# Patient Record
Sex: MALE | Race: WHITE | NOT HISPANIC OR LATINO | Employment: FULL TIME | URBAN - METROPOLITAN AREA
[De-identification: names, ages, dates, MRNs, and addresses within clinical notes are randomized per-mention and may not be internally consistent; named-entity substitution may affect disease eponyms.]

---

## 2023-05-22 LAB
ALANINE AMINOTRANSFERASE (SGPT) (U/L) IN SER/PLAS: 30 U/L (ref 10–52)
ALBUMIN (G/DL) IN SER/PLAS: 4.3 G/DL (ref 3.4–5)
ALKALINE PHOSPHATASE (U/L) IN SER/PLAS: 47 U/L (ref 33–120)
ANION GAP IN SER/PLAS: 12 MMOL/L (ref 10–20)
ASPARTATE AMINOTRANSFERASE (SGOT) (U/L) IN SER/PLAS: 23 U/L (ref 9–39)
BILIRUBIN TOTAL (MG/DL) IN SER/PLAS: 0.3 MG/DL (ref 0–1.2)
C REACTIVE PROTEIN (MG/L) IN SER/PLAS: 0.1 MG/DL
CALCIDIOL (25 OH VITAMIN D3) (NG/ML) IN SER/PLAS: 23 NG/ML
CALCIUM (MG/DL) IN SER/PLAS: 9.8 MG/DL (ref 8.6–10.3)
CARBON DIOXIDE, TOTAL (MMOL/L) IN SER/PLAS: 26 MMOL/L (ref 21–32)
CHLORIDE (MMOL/L) IN SER/PLAS: 105 MMOL/L (ref 98–107)
CHOLESTEROL (MG/DL) IN SER/PLAS: 151 MG/DL (ref 0–199)
CHOLESTEROL IN HDL (MG/DL) IN SER/PLAS: 31.3 MG/DL
CHOLESTEROL/HDL RATIO: 4.8
CREATINE KINASE (U/L) IN SER/PLAS: 111 U/L (ref 0–325)
CREATININE (MG/DL) IN SER/PLAS: 0.81 MG/DL (ref 0.5–1.3)
ERYTHROCYTE DISTRIBUTION WIDTH (RATIO) BY AUTOMATED COUNT: 13.6 % (ref 11.5–14.5)
ERYTHROCYTE MEAN CORPUSCULAR HEMOGLOBIN CONCENTRATION (G/DL) BY AUTOMATED: 33.6 G/DL (ref 32–36)
ERYTHROCYTE MEAN CORPUSCULAR VOLUME (FL) BY AUTOMATED COUNT: 88 FL (ref 80–100)
ERYTHROCYTES (10*6/UL) IN BLOOD BY AUTOMATED COUNT: 5.15 X10E12/L (ref 4.5–5.9)
ESTIMATED AVERAGE GLUCOSE FOR HBA1C: 108 MG/DL
GFR MALE: >90 ML/MIN/1.73M2
GLUCOSE (MG/DL) IN SER/PLAS: 87 MG/DL (ref 74–99)
HEMATOCRIT (%) IN BLOOD BY AUTOMATED COUNT: 45.5 % (ref 41–52)
HEMOGLOBIN (G/DL) IN BLOOD: 15.3 G/DL (ref 13.5–17.5)
HEMOGLOBIN A1C/HEMOGLOBIN TOTAL IN BLOOD: 5.4 %
LDL: 78 MG/DL (ref 0–99)
LEUKOCYTES (10*3/UL) IN BLOOD BY AUTOMATED COUNT: 5.4 X10E9/L (ref 4.4–11.3)
NON HDL CHOLESTEROL: 120 MG/DL
PLATELETS (10*3/UL) IN BLOOD AUTOMATED COUNT: 240 X10E9/L (ref 150–450)
POTASSIUM (MMOL/L) IN SER/PLAS: 4.5 MMOL/L (ref 3.5–5.3)
PROTEIN TOTAL: 8 G/DL (ref 6.4–8.2)
SEDIMENTATION RATE, ERYTHROCYTE: 8 MM/H (ref 0–15)
SODIUM (MMOL/L) IN SER/PLAS: 138 MMOL/L (ref 136–145)
THYROTROPIN (MIU/L) IN SER/PLAS BY DETECTION LIMIT <= 0.05 MIU/L: 1.44 MIU/L (ref 0.44–3.98)
TRIGLYCERIDE (MG/DL) IN SER/PLAS: 211 MG/DL (ref 0–149)
UREA NITROGEN (MG/DL) IN SER/PLAS: 10 MG/DL (ref 6–23)
VLDL: 42 MG/DL (ref 0–40)

## 2023-05-23 ENCOUNTER — APPOINTMENT (OUTPATIENT)
Dept: PRIMARY CARE | Facility: CLINIC | Age: 43
End: 2023-05-23
Payer: COMMERCIAL

## 2023-05-23 LAB
ANTI-DNA (DS): <1 IU/ML
COMPLEMENT C3 (MG/DL) IN SER/PLAS: 147 MG/DL (ref 87–200)
COMPLEMENT C4 (MG/DL) IN SER/PLAS: 39 MG/DL (ref 10–50)

## 2023-05-24 ENCOUNTER — TELEPHONE (OUTPATIENT)
Dept: PRIMARY CARE | Facility: CLINIC | Age: 43
End: 2023-05-24
Payer: COMMERCIAL

## 2023-05-24 DIAGNOSIS — M19.071 ARTHRITIS OF ANKLE, RIGHT: ICD-10-CM

## 2023-05-24 DIAGNOSIS — M25.579 ANKLE PAIN, UNSPECIFIED CHRONICITY, UNSPECIFIED LATERALITY: ICD-10-CM

## 2023-05-24 DIAGNOSIS — M19.072 ARTHRITIS OF ANKLE, LEFT: ICD-10-CM

## 2023-05-24 DIAGNOSIS — R76.8 ANA POSITIVE: ICD-10-CM

## 2023-05-24 PROBLEM — F41.1 GENERALIZED ANXIETY DISORDER: Status: ACTIVE | Noted: 2023-05-24

## 2023-05-24 PROBLEM — G47.33 OBSTRUCTIVE SLEEP APNEA: Status: ACTIVE | Noted: 2023-05-24

## 2023-05-24 PROBLEM — L40.9 PSORIASIS: Status: ACTIVE | Noted: 2023-05-24

## 2023-05-24 PROBLEM — I10 BENIGN ESSENTIAL HYPERTENSION: Status: ACTIVE | Noted: 2023-05-24

## 2023-05-24 PROBLEM — E55.9 VITAMIN D DEFICIENCY: Status: ACTIVE | Noted: 2023-05-24

## 2023-05-24 PROBLEM — M35.9 CONNECTIVE TISSUE DISEASE (MULTI): Status: ACTIVE | Noted: 2023-05-24

## 2023-05-24 PROBLEM — M54.16 CHRONIC LUMBAR RADICULOPATHY: Status: ACTIVE | Noted: 2023-05-24

## 2023-05-24 PROBLEM — M21.6X2 ACQUIRED BILATERAL PES CAVUS: Status: ACTIVE | Noted: 2023-05-24

## 2023-05-24 PROBLEM — M79.671 RIGHT FOOT PAIN: Status: ACTIVE | Noted: 2023-05-24

## 2023-05-24 PROBLEM — E66.812 CLASS 2 SEVERE OBESITY WITH BODY MASS INDEX (BMI) OF 35 TO 39.9 WITH SERIOUS COMORBIDITY: Status: ACTIVE | Noted: 2023-05-24

## 2023-05-24 PROBLEM — F32.1 DEPRESSION, MAJOR, SINGLE EPISODE, MODERATE (MULTI): Status: ACTIVE | Noted: 2023-05-24

## 2023-05-24 PROBLEM — R73.01 IMPAIRED FASTING GLUCOSE: Status: ACTIVE | Noted: 2023-05-24

## 2023-05-24 PROBLEM — M47.816 DEGENERATIVE JOINT DISEASE (DJD) OF LUMBAR SPINE: Status: ACTIVE | Noted: 2023-05-24

## 2023-05-24 PROBLEM — M21.619 ASYMPTOMATIC BUNION: Status: ACTIVE | Noted: 2023-05-24

## 2023-05-24 PROBLEM — M10.9 GOUT: Status: ACTIVE | Noted: 2023-05-24

## 2023-05-24 PROBLEM — M67.88 RIGHT PERONEAL TENDINOSIS: Status: ACTIVE | Noted: 2023-05-24

## 2023-05-24 PROBLEM — E66.01 MORBID OBESITY (MULTI): Status: ACTIVE | Noted: 2023-05-24

## 2023-05-24 PROBLEM — I73.9: Status: ACTIVE | Noted: 2023-05-24

## 2023-05-24 PROBLEM — L40.50 PSORIATIC ARTHRITIS (MULTI): Status: ACTIVE | Noted: 2023-05-24

## 2023-05-24 PROBLEM — E78.5 HYPERLIPIDEMIA: Status: ACTIVE | Noted: 2023-05-24

## 2023-05-24 PROBLEM — M21.6X1 ACQUIRED BILATERAL PES CAVUS: Status: ACTIVE | Noted: 2023-05-24

## 2023-05-24 PROBLEM — E66.01 CLASS 2 SEVERE OBESITY WITH BODY MASS INDEX (BMI) OF 35 TO 39.9 WITH SERIOUS COMORBIDITY (MULTI): Status: ACTIVE | Noted: 2023-05-24

## 2023-05-24 PROBLEM — K57.32 DIVERTICULITIS OF COLON: Status: ACTIVE | Noted: 2023-05-24

## 2023-05-24 PROBLEM — E78.1 ESSENTIAL HYPERTRIGLYCERIDEMIA: Status: ACTIVE | Noted: 2023-05-24

## 2023-05-24 PROBLEM — M54.42 LOW BACK PAIN WITH LEFT-SIDED SCIATICA: Status: ACTIVE | Noted: 2023-05-24

## 2023-05-24 PROBLEM — I73.00 RAYNAUD'S PHENOMENON WITHOUT GANGRENE: Status: ACTIVE | Noted: 2023-05-24

## 2023-05-24 PROBLEM — G89.29 CHRONIC BACK PAIN GREATER THAN 3 MONTHS DURATION: Status: ACTIVE | Noted: 2023-05-24

## 2023-05-24 PROBLEM — M54.9 CHRONIC BACK PAIN GREATER THAN 3 MONTHS DURATION: Status: ACTIVE | Noted: 2023-05-24

## 2023-05-24 RX ORDER — CELECOXIB 200 MG/1
200 CAPSULE ORAL
Qty: 60 CAPSULE | Refills: 2 | Status: SHIPPED | OUTPATIENT
Start: 2023-05-24 | End: 2023-07-26 | Stop reason: SDUPTHER

## 2023-05-24 RX ORDER — CELECOXIB 200 MG/1
200 CAPSULE ORAL
COMMUNITY
End: 2023-05-24 | Stop reason: SDUPTHER

## 2023-05-24 NOTE — TELEPHONE ENCOUNTER
Refill:   Celecoxib 200mg  1 capsule twice a day    Pharmacy:  [facility]    Last seen: 8/29/2022  Future appointment: 6/26/2023    He just ran out yesterday.  He has made his last script last this long.

## 2023-06-26 ENCOUNTER — APPOINTMENT (OUTPATIENT)
Dept: PRIMARY CARE | Facility: CLINIC | Age: 43
End: 2023-06-26
Payer: COMMERCIAL

## 2023-07-26 ENCOUNTER — OFFICE VISIT (OUTPATIENT)
Dept: PRIMARY CARE | Facility: CLINIC | Age: 43
End: 2023-07-26
Payer: COMMERCIAL

## 2023-07-26 VITALS
HEART RATE: 80 BPM | SYSTOLIC BLOOD PRESSURE: 128 MMHG | HEIGHT: 74 IN | DIASTOLIC BLOOD PRESSURE: 82 MMHG | BODY MASS INDEX: 40.02 KG/M2 | WEIGHT: 311.8 LBS | TEMPERATURE: 97.7 F | OXYGEN SATURATION: 94 %

## 2023-07-26 DIAGNOSIS — M19.071 ARTHRITIS OF ANKLE, RIGHT: ICD-10-CM

## 2023-07-26 DIAGNOSIS — E55.9 VITAMIN D DEFICIENCY: ICD-10-CM

## 2023-07-26 DIAGNOSIS — H61.23 BILATERAL IMPACTED CERUMEN: ICD-10-CM

## 2023-07-26 DIAGNOSIS — M19.072 ARTHRITIS OF ANKLE, LEFT: ICD-10-CM

## 2023-07-26 DIAGNOSIS — L40.50 PSORIATIC ARTHRITIS (MULTI): ICD-10-CM

## 2023-07-26 DIAGNOSIS — R73.01 IMPAIRED FASTING GLUCOSE: ICD-10-CM

## 2023-07-26 DIAGNOSIS — E78.5 HYPERLIPIDEMIA, UNSPECIFIED HYPERLIPIDEMIA TYPE: ICD-10-CM

## 2023-07-26 DIAGNOSIS — E66.01 MORBID OBESITY (MULTI): ICD-10-CM

## 2023-07-26 DIAGNOSIS — R76.8 ANA POSITIVE: ICD-10-CM

## 2023-07-26 DIAGNOSIS — I10 BENIGN ESSENTIAL HYPERTENSION: ICD-10-CM

## 2023-07-26 DIAGNOSIS — M25.579 ANKLE PAIN, UNSPECIFIED CHRONICITY, UNSPECIFIED LATERALITY: ICD-10-CM

## 2023-07-26 DIAGNOSIS — F32.1 DEPRESSION, MAJOR, SINGLE EPISODE, MODERATE (MULTI): Primary | ICD-10-CM

## 2023-07-26 PROCEDURE — 1036F TOBACCO NON-USER: CPT | Performed by: FAMILY MEDICINE

## 2023-07-26 PROCEDURE — 3074F SYST BP LT 130 MM HG: CPT | Performed by: FAMILY MEDICINE

## 2023-07-26 PROCEDURE — 69209 REMOVE IMPACTED EAR WAX UNI: CPT | Performed by: FAMILY MEDICINE

## 2023-07-26 PROCEDURE — 99214 OFFICE O/P EST MOD 30 MIN: CPT | Performed by: FAMILY MEDICINE

## 2023-07-26 PROCEDURE — 3079F DIAST BP 80-89 MM HG: CPT | Performed by: FAMILY MEDICINE

## 2023-07-26 RX ORDER — FLUOXETINE HYDROCHLORIDE 40 MG/1
40 CAPSULE ORAL DAILY
COMMUNITY
End: 2023-07-26 | Stop reason: SDUPTHER

## 2023-07-26 RX ORDER — LISINOPRIL 20 MG/1
20 TABLET ORAL DAILY
COMMUNITY
End: 2023-07-26 | Stop reason: SDUPTHER

## 2023-07-26 RX ORDER — CYCLOBENZAPRINE HCL 5 MG
10 TABLET ORAL 2 TIMES DAILY
Qty: 120 TABLET | Refills: 5 | Status: SHIPPED | OUTPATIENT
Start: 2023-07-26 | End: 2023-12-18 | Stop reason: WASHOUT

## 2023-07-26 RX ORDER — CELECOXIB 200 MG/1
200 CAPSULE ORAL
Qty: 60 CAPSULE | Refills: 5 | Status: SHIPPED | OUTPATIENT
Start: 2023-07-26 | End: 2024-04-08

## 2023-07-26 RX ORDER — BUSPIRONE HYDROCHLORIDE 15 MG/1
15 TABLET ORAL 2 TIMES DAILY
Qty: 60 TABLET | Refills: 5 | Status: SHIPPED | OUTPATIENT
Start: 2023-07-26 | End: 2024-02-15 | Stop reason: SDUPTHER

## 2023-07-26 RX ORDER — FLUOXETINE HYDROCHLORIDE 40 MG/1
40 CAPSULE ORAL DAILY
Qty: 30 CAPSULE | Refills: 5 | Status: SHIPPED | OUTPATIENT
Start: 2023-07-26 | End: 2024-02-15 | Stop reason: SDUPTHER

## 2023-07-26 RX ORDER — HYDROXYCHLOROQUINE SULFATE 200 MG/1
1 TABLET, FILM COATED ORAL 2 TIMES DAILY
COMMUNITY
End: 2023-10-09 | Stop reason: SDUPTHER

## 2023-07-26 RX ORDER — LISINOPRIL 20 MG/1
20 TABLET ORAL DAILY
Qty: 30 TABLET | Refills: 5 | Status: SHIPPED | OUTPATIENT
Start: 2023-07-26 | End: 2024-02-15 | Stop reason: SDUPTHER

## 2023-07-26 RX ORDER — FENOFIBRATE 160 MG/1
1 TABLET ORAL DAILY
COMMUNITY
Start: 2017-09-22 | End: 2023-07-26 | Stop reason: SDUPTHER

## 2023-07-26 RX ORDER — CYCLOBENZAPRINE HCL 5 MG
10 TABLET ORAL 2 TIMES DAILY
COMMUNITY
Start: 2017-09-20 | End: 2023-07-26 | Stop reason: SDUPTHER

## 2023-07-26 RX ORDER — BUSPIRONE HYDROCHLORIDE 15 MG/1
1 TABLET ORAL 2 TIMES DAILY
COMMUNITY
Start: 2021-04-29 | End: 2023-07-26 | Stop reason: SDUPTHER

## 2023-07-26 RX ORDER — FENOFIBRATE 160 MG/1
160 TABLET ORAL DAILY
Qty: 30 TABLET | Refills: 5 | Status: SHIPPED | OUTPATIENT
Start: 2023-07-26 | End: 2024-02-15 | Stop reason: SDUPTHER

## 2023-07-26 RX ORDER — AMLODIPINE BESYLATE 5 MG/1
1 TABLET ORAL DAILY
COMMUNITY
Start: 2022-10-17 | End: 2023-10-09 | Stop reason: SDUPTHER

## 2023-07-26 ASSESSMENT — ENCOUNTER SYMPTOMS
FEVER: 0
CHEST TIGHTNESS: 0
CONFUSION: 0
ABDOMINAL PAIN: 0
PALPITATIONS: 0
SHORTNESS OF BREATH: 0
ARTHRALGIAS: 1
CHILLS: 0

## 2023-07-26 ASSESSMENT — PATIENT HEALTH QUESTIONNAIRE - PHQ9
SUM OF ALL RESPONSES TO PHQ9 QUESTIONS 1 AND 2: 0
1. LITTLE INTEREST OR PLEASURE IN DOING THINGS: NOT AT ALL
2. FEELING DOWN, DEPRESSED OR HOPELESS: NOT AT ALL

## 2023-07-26 NOTE — PROGRESS NOTES
"Subjective   Patient ID: Chacho William is a 42 y.o. male who presents for Follow-up.    HPI patient today for follow-up of ongoing healthcare issues and review of lab work for most part states has been feeling good.  Taking medications as prescribed.  He has had recent follow-up with his rheumatologist.  And has an appointment for eye exam in August.  Has noticed that his ears been somewhat clogged and occasionally get some wax out of there.    Review of Systems   Constitutional:  Negative for chills and fever.   HENT:  Negative for congestion and ear pain.    Eyes:  Negative for visual disturbance.   Respiratory:  Negative for chest tightness and shortness of breath.    Cardiovascular:  Negative for chest pain and palpitations.   Gastrointestinal:  Negative for abdominal pain.   Musculoskeletal:  Positive for arthralgias.   Skin:  Negative for pallor.   Psychiatric/Behavioral:  Negative for confusion.        Objective   /82   Pulse 80   Temp 36.5 °C (97.7 °F)   Ht 1.88 m (6' 2\")   Wt 141 kg (311 lb 12.8 oz)   SpO2 94%   BMI 40.03 kg/m²     Physical Exam  Vitals and nursing note reviewed.   Constitutional:       General: He is not in acute distress.     Appearance: Normal appearance. He is not ill-appearing.   HENT:      Head: Normocephalic and atraumatic.      Right Ear: Tympanic membrane, ear canal and external ear normal. There is impacted cerumen.      Left Ear: Tympanic membrane, ear canal and external ear normal. There is impacted cerumen.      Mouth/Throat:      Pharynx: Oropharynx is clear.   Eyes:      Extraocular Movements: Extraocular movements intact.   Cardiovascular:      Rate and Rhythm: Normal rate and regular rhythm.      Pulses: Normal pulses.      Heart sounds: Normal heart sounds.   Pulmonary:      Effort: Pulmonary effort is normal.      Breath sounds: Normal breath sounds.   Abdominal:      General: Abdomen is flat. Bowel sounds are normal.      Palpations: Abdomen is soft.      " Tenderness: There is no abdominal tenderness.   Musculoskeletal:         General: Normal range of motion.      Cervical back: Neck supple.   Skin:     General: Skin is warm.   Neurological:      Mental Status: He is alert and oriented to person, place, and time. Mental status is at baseline.   Psychiatric:         Mood and Affect: Mood normal.     Recent labs from May reviewed with the patient    Bilateral ear lavage    Continue to follow with specialist    Return to office 4 months with repeat fasting labs  Necessary refills provided    Assessment/Plan   Problem List Items Addressed This Visit       KATI positive    Ankle pain    Arthritis of ankle, left     Refill Celebrex         Arthritis of ankle, right    Benign essential hypertension     Stable continue current medication         Morbid obesity (CMS/HCC)     Reviewed diet weight loss strategies exercise as tolerated         Depression, major, single episode, moderate (CMS/HCC) - Primary     Stable continue fluoxetine 40 mg daily         Hyperlipidemia     LDLs to goal nice improvement in triglycerides continue fenofibrate 160 mg daily         Impaired fasting glucose     A1c 5.4% continue dietary modifications         Psoriatic arthritis (CMS/HCC)     Clinically stable continue current medications         Vitamin D deficiency     Continue to monitor and supplement         Bilateral impacted cerumen     Ear lavage

## 2023-07-26 NOTE — PROGRESS NOTES
Patient ID: Chacho William is a 42 y.o. male.    Ear Cerumen Removal    Date/Time: 7/26/2023 6:02 PM    Performed by: Ivanna Martinez MA  Authorized by: Justice Vargas MD    Consent:     Consent obtained:  Verbal    Consent given by:  Patient    Risks, benefits, and alternatives were discussed: yes      Risks discussed:  Pain, incomplete removal and dizziness  Universal protocol:     Procedure explained and questions answered to patient or proxy's satisfaction: yes      Site/side marked: yes      Immediately prior to procedure, a time out was called: no      Patient identity confirmed:  Verbally with patient  Procedure details:     Location:  L ear and R ear    Procedure type: irrigation      Procedure outcomes: cerumen removed    Post-procedure details:     Inspection:  Ear canal clear    Procedure completion:  Tolerated well, no immediate complications

## 2023-09-12 ENCOUNTER — TELEPHONE (OUTPATIENT)
Dept: PRIMARY CARE | Facility: CLINIC | Age: 43
End: 2023-09-12
Payer: COMMERCIAL

## 2023-09-12 DIAGNOSIS — M10.00 IDIOPATHIC GOUT, UNSPECIFIED CHRONICITY, UNSPECIFIED SITE: Primary | ICD-10-CM

## 2023-09-12 RX ORDER — PREDNISONE 10 MG/1
TABLET ORAL
Qty: 30 TABLET | Refills: 0 | Status: SHIPPED | OUTPATIENT
Start: 2023-09-12 | End: 2023-09-22

## 2023-09-12 NOTE — TELEPHONE ENCOUNTER
Patient called in and stated that he is having a gout flare-up and he is asking for Prednisone to be sent to [facility] in [place].

## 2023-10-07 PROBLEM — F32.9 MAJOR DEPRESSIVE DISORDER, SINGLE EPISODE: Status: ACTIVE | Noted: 2019-10-02

## 2023-10-07 PROBLEM — M71.161: Status: ACTIVE | Noted: 2019-10-02

## 2023-10-07 PROBLEM — R20.0 ARM NUMBNESS LEFT: Status: ACTIVE | Noted: 2023-10-07

## 2023-10-07 PROBLEM — F41.9 ANXIETY DISORDER, UNSPECIFIED: Status: ACTIVE | Noted: 2019-10-02

## 2023-10-07 PROBLEM — E66.9 OBESITY: Status: ACTIVE | Noted: 2019-10-02

## 2023-10-07 PROBLEM — A41.01 SEPSIS DUE TO METHICILLIN SUSCEPTIBLE STAPHYLOCOCCUS AUREUS (MULTI): Status: ACTIVE | Noted: 2019-10-02

## 2023-10-09 ENCOUNTER — LAB (OUTPATIENT)
Dept: LAB | Facility: LAB | Age: 43
End: 2023-10-09
Payer: COMMERCIAL

## 2023-10-09 ENCOUNTER — OFFICE VISIT (OUTPATIENT)
Dept: RHEUMATOLOGY | Facility: CLINIC | Age: 43
End: 2023-10-09
Payer: COMMERCIAL

## 2023-10-09 VITALS — BODY MASS INDEX: 38.65 KG/M2 | DIASTOLIC BLOOD PRESSURE: 87 MMHG | WEIGHT: 301 LBS | SYSTOLIC BLOOD PRESSURE: 134 MMHG

## 2023-10-09 DIAGNOSIS — I73.00 RAYNAUD'S PHENOMENON WITHOUT GANGRENE: Primary | ICD-10-CM

## 2023-10-09 DIAGNOSIS — M35.9 CONNECTIVE TISSUE DISEASE (MULTI): ICD-10-CM

## 2023-10-09 LAB
CRP SERPL-MCNC: 0.17 MG/DL
ERYTHROCYTE [DISTWIDTH] IN BLOOD BY AUTOMATED COUNT: 13.1 % (ref 11.5–14.5)
ERYTHROCYTE [SEDIMENTATION RATE] IN BLOOD BY WESTERGREN METHOD: 18 MM/H (ref 0–15)
HCT VFR BLD AUTO: 44 % (ref 41–52)
HGB BLD-MCNC: 14.8 G/DL (ref 13.5–17.5)
MCH RBC QN AUTO: 30.1 PG (ref 26–34)
MCHC RBC AUTO-ENTMCNC: 33.6 G/DL (ref 32–36)
MCV RBC AUTO: 89 FL (ref 80–100)
NRBC BLD-RTO: 0 /100 WBCS (ref 0–0)
PLATELET # BLD AUTO: 274 X10*3/UL (ref 150–450)
PMV BLD AUTO: 11.1 FL (ref 7.5–11.5)
RBC # BLD AUTO: 4.92 X10*6/UL (ref 4.5–5.9)
WBC # BLD AUTO: 5.6 X10*3/UL (ref 4.4–11.3)

## 2023-10-09 PROCEDURE — 99212 OFFICE O/P EST SF 10 MIN: CPT | Performed by: INTERNAL MEDICINE

## 2023-10-09 PROCEDURE — 36415 COLL VENOUS BLD VENIPUNCTURE: CPT

## 2023-10-09 PROCEDURE — 3075F SYST BP GE 130 - 139MM HG: CPT | Performed by: INTERNAL MEDICINE

## 2023-10-09 PROCEDURE — 1036F TOBACCO NON-USER: CPT | Performed by: INTERNAL MEDICINE

## 2023-10-09 PROCEDURE — 86140 C-REACTIVE PROTEIN: CPT

## 2023-10-09 PROCEDURE — 85652 RBC SED RATE AUTOMATED: CPT

## 2023-10-09 PROCEDURE — 3079F DIAST BP 80-89 MM HG: CPT | Performed by: INTERNAL MEDICINE

## 2023-10-09 PROCEDURE — 85027 COMPLETE CBC AUTOMATED: CPT

## 2023-10-09 RX ORDER — HYDROXYCHLOROQUINE SULFATE 200 MG/1
200 TABLET, FILM COATED ORAL 2 TIMES DAILY
Qty: 180 TABLET | Refills: 0 | Status: SHIPPED | OUTPATIENT
Start: 2023-10-09 | End: 2024-01-07 | Stop reason: WASHOUT

## 2023-10-09 RX ORDER — AMLODIPINE BESYLATE 5 MG/1
5 TABLET ORAL DAILY
Qty: 90 TABLET | Refills: 0 | Status: SHIPPED | OUTPATIENT
Start: 2023-10-09 | End: 2024-02-12

## 2023-10-09 NOTE — PROGRESS NOTES
"Subjective   Patient ID: Chacho William is a 43 y.o. male who presents for Follow-up.    HPI  43-year-old male with history of CTD, Raynaud's phenomena, chronic low back pain with radiculopathy, gout, psoriasis, anxiety, depression, obesity and EMANI presented for follow-up.    He feels intermittent weakness and left arm.  He reports chronic weakness in the legs and ankles.  Uses ankle braces.  He is following with podiatrist.    Raynaud's is not too bad.    Medications: Hydroxychloroquine(8/2022) and amlodipine.    Review of Systems   All other systems reviewed and are negative.  Objective       8/29/2022     3:51 PM 8/29/2022     4:27 PM 10/17/2022     4:00 PM 12/8/2022     4:00 PM 2/14/2023     4:01 PM 7/26/2023     2:07 PM 10/9/2023     3:44 PM   Vitals   Systolic 131 130 141 119 140 128 134   Diastolic 91 84 96 82 98 82 87   Heart Rate 81   92  80    Temp 36.3 °C (97.3 °F)   36.6 °C (97.8 °F)  36.5 °C (97.7 °F)    Resp 14   14      Height (in)   1.88 m (6' 2\")  1.88 m (6' 2\") 1.88 m (6' 2\")    Weight (lb) 314  313 315 312 311.8 301   BMI 40.32 kg/m2  40.19 kg/m2 40.44 kg/m2 40.06 kg/m2 40.03 kg/m2 38.65 kg/m2   BSA (m2) 2.72 m2  2.72 m2 2.73 m2 2.72 m2 2.71 m2 2.67 m2   Visit Report      Report Report      Physical Exam  Gen. AAO x3, NAD.  HEENT: No pallor or icterus, PERRLA, EOMI.  No cervical lymphadenopathy .  Skin: No rashes.  Heart: S1, S2/ RRR.   Lungs: CTA B.  Abdomen: Soft, NT/ND  MSK: No swollen or tender joint.  Bilateral shoulder with full range of motion.  Bilateral knee and ankles without swelling and tenderness.    Neuro: Sensation to touch intact.Strength 5/5 throughout.   Psych:Appropriate mood and behavior  EXT: No edema  Assessment/Plan   43-year-old male with history of CTD, Raynaud's phenomena, chronic low back pain with radiculopathy, gout, psoriasis, anxiety, depression, obesity and EMANI presented for follow-up.      #1: Connective tissue disease/Raynaud's phenomena.  She is doing " well.  -Continue hydroxychloroquine twice a day.  Eye exam up-to-date.  -Continue amlodipine daily.  -Labs.    Follow-up in 4 months.     This note was partially generated using the Dragon Voice recognition system. There may be some incorrect wording, spelling and/or spelling errors or punctuation errors that were not corrected prior to committing the note to the medical record.    Patient Active Problem List   Diagnosis    Acquired bilateral pes cavus    KATI positive    Ankle pain    Arthritis of ankle, left    Arthritis of ankle, right    Asymptomatic bunion    Benign essential hypertension    Connective tissue disease (CMS/HCC)    Class 2 severe obesity with body mass index (BMI) of 35 to 39.9 with serious comorbidity (CMS/HCC)    Chronic lumbar radiculopathy    Chronic back pain greater than 3 months duration    Morbid obesity (CMS/HCC)    Degenerative joint disease (DJD) of lumbar spine    Depression, major, single episode, moderate (CMS/HCC)    Diverticulitis of colon    Essential hypertriglyceridemia    Generalized anxiety disorder    Gout    Hyperlipidemia    Impaired fasting glucose    Low back pain with left-sided sciatica    Obstructive sleep apnea    Peripheral vascular disorder (CMS/HCC)    Psoriasis    Psoriatic arthritis (CMS/HCC)    Raynaud's phenomenon without gangrene    Right foot pain    Right peroneal tendinosis    Vitamin D deficiency    Bilateral impacted cerumen    Arm numbness left    Sepsis due to methicillin susceptible Staphylococcus aureus (CMS/HCC)    Other infective bursitis, right knee    Obesity    Major depressive disorder, single episode    Anxiety disorder, unspecified      Past Surgical History:   Procedure Laterality Date    OTHER SURGICAL HISTORY  10/15/2019    Tonsillectomy    OTHER SURGICAL HISTORY  10/15/2019    Back surgery    OTHER SURGICAL HISTORY  10/15/2019    Laminectomy    OTHER SURGICAL HISTORY  10/18/2019    Knee surgery    OTHER SURGICAL HISTORY  10/18/2019    Foot  fracture repair      Social History     Tobacco Use    Smoking status: Never    Smokeless tobacco: Never   Substance Use Topics    Alcohol use: Yes     Comment: 5 drinks per month sometimes      Family History   Problem Relation Name Age of Onset    Other (htn) Mother      Breast cancer Mother      Lung cancer Father        Allergies   Allergen Reactions    Nsaids (Non-Steroidal Anti-Inflammatory Drug) Nausea Only and Other      Current Outpatient Medications   Medication Instructions    amLODIPine (NORVASC) 5 mg, oral, Daily    busPIRone (BUSPAR) 15 mg, oral, 2 times daily    celecoxib (CELEBREX) 200 mg, oral, 2 times daily with meals    cyclobenzaprine (FLEXERIL) 10 mg, oral, 2 times daily    fenofibrate (TRIGLIDE) 160 mg, oral, Daily    FLUoxetine (PROZAC) 40 mg, oral, Daily    hydroxychloroquine (PLAQUENIL) 200 mg, oral, 2 times daily    lisinopril 20 mg, oral, Daily

## 2023-10-09 NOTE — PATIENT INSTRUCTIONS
Continue hydroxychloroquine and amlodipine as prescribed.  Labs.  Call me if any question.  Follow-up in 4 months.

## 2023-11-14 ENCOUNTER — TELEPHONE (OUTPATIENT)
Dept: PRIMARY CARE | Facility: CLINIC | Age: 43
End: 2023-11-14
Payer: COMMERCIAL

## 2023-11-14 DIAGNOSIS — J06.9 UPPER RESPIRATORY TRACT INFECTION, UNSPECIFIED TYPE: Primary | ICD-10-CM

## 2023-11-14 RX ORDER — CEFDINIR 300 MG/1
300 CAPSULE ORAL 2 TIMES DAILY
Qty: 20 CAPSULE | Refills: 0 | Status: SHIPPED | OUTPATIENT
Start: 2023-11-14 | End: 2023-11-24

## 2023-11-14 NOTE — TELEPHONE ENCOUNTER
Patient called in with symptoms of chest congestion, cough, and chills. He has not covid tested. He is wanting an antibiotic sent to his preferred pharmacy but will come in if required. Please advise.

## 2023-11-28 ENCOUNTER — APPOINTMENT (OUTPATIENT)
Dept: PRIMARY CARE | Facility: CLINIC | Age: 43
End: 2023-11-28
Payer: COMMERCIAL

## 2023-12-18 ENCOUNTER — OFFICE VISIT (OUTPATIENT)
Dept: PRIMARY CARE | Facility: CLINIC | Age: 43
End: 2023-12-18
Payer: COMMERCIAL

## 2023-12-18 VITALS
WEIGHT: 292.4 LBS | BODY MASS INDEX: 37.53 KG/M2 | HEART RATE: 79 BPM | OXYGEN SATURATION: 94 % | SYSTOLIC BLOOD PRESSURE: 124 MMHG | HEIGHT: 74 IN | TEMPERATURE: 97.3 F | DIASTOLIC BLOOD PRESSURE: 84 MMHG

## 2023-12-18 DIAGNOSIS — I10 BENIGN ESSENTIAL HYPERTENSION: ICD-10-CM

## 2023-12-18 DIAGNOSIS — E78.49 OTHER HYPERLIPIDEMIA: ICD-10-CM

## 2023-12-18 DIAGNOSIS — R73.01 IMPAIRED FASTING GLUCOSE: ICD-10-CM

## 2023-12-18 DIAGNOSIS — E78.1 ESSENTIAL HYPERTRIGLYCERIDEMIA: ICD-10-CM

## 2023-12-18 DIAGNOSIS — E55.9 VITAMIN D DEFICIENCY: ICD-10-CM

## 2023-12-18 DIAGNOSIS — F41.1 GENERALIZED ANXIETY DISORDER: ICD-10-CM

## 2023-12-18 DIAGNOSIS — E66.01 SEVERE OBESITY (BMI 35.0-39.9) WITH COMORBIDITY (MULTI): ICD-10-CM

## 2023-12-18 DIAGNOSIS — Z13.29 THYROID DISORDER SCREEN: ICD-10-CM

## 2023-12-18 DIAGNOSIS — F32.1 DEPRESSION, MAJOR, SINGLE EPISODE, MODERATE (MULTI): Primary | ICD-10-CM

## 2023-12-18 DIAGNOSIS — E78.5 HYPERLIPIDEMIA, UNSPECIFIED HYPERLIPIDEMIA TYPE: ICD-10-CM

## 2023-12-18 PROBLEM — H61.23 BILATERAL IMPACTED CERUMEN: Status: RESOLVED | Noted: 2023-07-26 | Resolved: 2023-12-18

## 2023-12-18 PROBLEM — K57.32 DIVERTICULITIS OF COLON: Status: RESOLVED | Noted: 2023-05-24 | Resolved: 2023-12-18

## 2023-12-18 PROBLEM — M71.161: Status: RESOLVED | Noted: 2019-10-02 | Resolved: 2023-12-18

## 2023-12-18 PROBLEM — J06.9 UPPER RESPIRATORY TRACT INFECTION: Status: RESOLVED | Noted: 2023-11-14 | Resolved: 2023-12-18

## 2023-12-18 PROBLEM — A41.01 SEPSIS DUE TO METHICILLIN SUSCEPTIBLE STAPHYLOCOCCUS AUREUS (MULTI): Status: RESOLVED | Noted: 2019-10-02 | Resolved: 2023-12-18

## 2023-12-18 PROBLEM — M67.88 RIGHT PERONEAL TENDINOSIS: Status: RESOLVED | Noted: 2023-05-24 | Resolved: 2023-12-18

## 2023-12-18 PROCEDURE — 3074F SYST BP LT 130 MM HG: CPT | Performed by: FAMILY MEDICINE

## 2023-12-18 PROCEDURE — 1036F TOBACCO NON-USER: CPT | Performed by: FAMILY MEDICINE

## 2023-12-18 PROCEDURE — 99213 OFFICE O/P EST LOW 20 MIN: CPT | Performed by: FAMILY MEDICINE

## 2023-12-18 PROCEDURE — 3079F DIAST BP 80-89 MM HG: CPT | Performed by: FAMILY MEDICINE

## 2023-12-18 RX ORDER — CYCLOBENZAPRINE HCL 10 MG
10 TABLET ORAL 2 TIMES DAILY PRN
COMMUNITY
End: 2024-04-15 | Stop reason: SDUPTHER

## 2023-12-18 ASSESSMENT — ENCOUNTER SYMPTOMS
PALPITATIONS: 0
FEVER: 0
ABDOMINAL PAIN: 0
SHORTNESS OF BREATH: 0
CHILLS: 0
DEPRESSION: 1
CHEST TIGHTNESS: 0
CONFUSION: 0
ARTHRALGIAS: 0

## 2023-12-18 NOTE — PROGRESS NOTES
"Subjective   Patient ID: Chacho William is a 43 y.o. male who presents for Annual Exam (4 month follow up with labs ).    HPI today for follow-up of ongoing healthcare issues and review of lab work unfortunately lab claims he could not find the orders in the system although they were there.  As result he will get his labs done within the next couple weeks and call for results.  Otherwise states has been doing okay continues to follow with rheumatology.  Lower extremity symptoms are doing better.  He has been able to be more active.    Review of Systems   Constitutional:  Negative for chills and fever.   HENT:  Negative for congestion and ear pain.    Eyes:  Negative for visual disturbance.   Respiratory:  Negative for chest tightness and shortness of breath.    Cardiovascular:  Negative for chest pain and palpitations.   Gastrointestinal:  Negative for abdominal pain.   Musculoskeletal:  Negative for arthralgias.   Skin:  Negative for pallor.   Psychiatric/Behavioral:  Negative for confusion.        Objective   /84 (BP Location: Right arm, Patient Position: Sitting, BP Cuff Size: Adult long)   Pulse 79   Temp 36.3 °C (97.3 °F) (Temporal)   Ht 1.88 m (6' 2\")   Wt 133 kg (292 lb 6.4 oz)   SpO2 94%   BMI 37.54 kg/m²     Physical Exam  Vitals and nursing note reviewed.   Constitutional:       General: He is not in acute distress.     Appearance: Normal appearance. He is not ill-appearing.   HENT:      Head: Normocephalic and atraumatic.      Right Ear: Tympanic membrane, ear canal and external ear normal.      Left Ear: Tympanic membrane, ear canal and external ear normal.      Mouth/Throat:      Pharynx: Oropharynx is clear.   Eyes:      Extraocular Movements: Extraocular movements intact.   Cardiovascular:      Rate and Rhythm: Normal rate and regular rhythm.      Pulses: Normal pulses.      Heart sounds: Normal heart sounds.   Pulmonary:      Effort: Pulmonary effort is normal.      Breath sounds: Normal " breath sounds.   Abdominal:      General: Abdomen is flat. Bowel sounds are normal.      Palpations: Abdomen is soft.      Tenderness: There is no abdominal tenderness.   Musculoskeletal:         General: Normal range of motion.      Cervical back: Neck supple.   Skin:     General: Skin is warm.   Neurological:      Mental Status: He is alert and oriented to person, place, and time. Mental status is at baseline.   Psychiatric:         Mood and Affect: Mood normal.     Fasting lab work completed within the next couple weeks call for results    Return to our office 4 months with repeat fasting labs a week before.  Declines flu vaccine  Assessment/Plan   Problem List Items Addressed This Visit             ICD-10-CM    Benign essential hypertension I10     Stable continue current treatment         Relevant Orders    Follow Up In Primary Care - Established    CBC    Comprehensive Metabolic Panel    Depression, major, single episode, moderate (CMS/HCC) - Primary F32.1     Stable continue fluoxetine 40 mg daily         Essential hypertriglyceridemia E78.1     Fenofibrate 160 mg daily check labs         Generalized anxiety disorder F41.1     Stable continue BuSpar 15 mg twice daily         Hyperlipidemia E78.5     Check fasting lipid work on dietary modifications         Relevant Orders    Follow Up In Primary Care - Established    Cholesterol, LDL Direct    Lipid Panel    Impaired fasting glucose R73.01     Low sugar diet check A1c with lab work in the spring 2024         Relevant Orders    Follow Up In Primary Care - Established    Hemoglobin A1C    Vitamin D deficiency E55.9     Continue to monitor supplement as needed         Relevant Orders    Vitamin D 25-Hydroxy,Total (for eval of Vitamin D levels)    Thyroid disorder screen Z13.29     TSH with reflex         Relevant Orders    TSH with reflex to Free T4 if abnormal    Severe obesity (BMI 35.0-39.9) with comorbidity (CMS/HCC) E66.01     Continue to work on lifestyle  modification diet exercise and weight loss

## 2024-02-05 DIAGNOSIS — I10 BENIGN ESSENTIAL HYPERTENSION: ICD-10-CM

## 2024-02-05 DIAGNOSIS — F32.1 DEPRESSION, MAJOR, SINGLE EPISODE, MODERATE (MULTI): ICD-10-CM

## 2024-02-12 ENCOUNTER — LAB (OUTPATIENT)
Dept: LAB | Facility: LAB | Age: 44
End: 2024-02-12
Payer: COMMERCIAL

## 2024-02-12 ENCOUNTER — OFFICE VISIT (OUTPATIENT)
Dept: RHEUMATOLOGY | Facility: CLINIC | Age: 44
End: 2024-02-12
Payer: COMMERCIAL

## 2024-02-12 VITALS — DIASTOLIC BLOOD PRESSURE: 81 MMHG | WEIGHT: 294 LBS | SYSTOLIC BLOOD PRESSURE: 115 MMHG | BODY MASS INDEX: 37.75 KG/M2

## 2024-02-12 DIAGNOSIS — I10 BENIGN ESSENTIAL HYPERTENSION: ICD-10-CM

## 2024-02-12 DIAGNOSIS — E78.5 HYPERLIPIDEMIA, UNSPECIFIED HYPERLIPIDEMIA TYPE: ICD-10-CM

## 2024-02-12 DIAGNOSIS — M35.9 CONNECTIVE TISSUE DISEASE (MULTI): Primary | ICD-10-CM

## 2024-02-12 DIAGNOSIS — I73.00 RAYNAUD'S PHENOMENON WITHOUT GANGRENE: ICD-10-CM

## 2024-02-12 DIAGNOSIS — R73.01 IMPAIRED FASTING GLUCOSE: ICD-10-CM

## 2024-02-12 DIAGNOSIS — F32.1 DEPRESSION, MAJOR, SINGLE EPISODE, MODERATE (MULTI): ICD-10-CM

## 2024-02-12 DIAGNOSIS — M35.9 CONNECTIVE TISSUE DISEASE (MULTI): ICD-10-CM

## 2024-02-12 LAB
ALBUMIN SERPL BCP-MCNC: 4.5 G/DL (ref 3.4–5)
ALP SERPL-CCNC: 63 U/L (ref 33–120)
ALT SERPL W P-5'-P-CCNC: 21 U/L (ref 10–52)
ANION GAP SERPL CALC-SCNC: 12 MMOL/L (ref 10–20)
AST SERPL W P-5'-P-CCNC: 16 U/L (ref 9–39)
BILIRUB SERPL-MCNC: 0.3 MG/DL (ref 0–1.2)
BUN SERPL-MCNC: 16 MG/DL (ref 6–23)
CALCIUM SERPL-MCNC: 10.3 MG/DL (ref 8.6–10.6)
CHLORIDE SERPL-SCNC: 105 MMOL/L (ref 98–107)
CHOLEST SERPL-MCNC: 155 MG/DL (ref 0–199)
CHOLESTEROL/HDL RATIO: 5.3
CO2 SERPL-SCNC: 30 MMOL/L (ref 21–32)
CREAT SERPL-MCNC: 0.8 MG/DL (ref 0.5–1.3)
CRP SERPL-MCNC: <0.1 MG/DL
EGFRCR SERPLBLD CKD-EPI 2021: >90 ML/MIN/1.73M*2
ERYTHROCYTE [DISTWIDTH] IN BLOOD BY AUTOMATED COUNT: 13.2 % (ref 11.5–14.5)
ERYTHROCYTE [SEDIMENTATION RATE] IN BLOOD BY WESTERGREN METHOD: 4 MM/H (ref 0–15)
EST. AVERAGE GLUCOSE BLD GHB EST-MCNC: 103 MG/DL
GLUCOSE SERPL-MCNC: 90 MG/DL (ref 74–99)
HBA1C MFR BLD: 5.2 %
HCT VFR BLD AUTO: 46.2 % (ref 41–52)
HDLC SERPL-MCNC: 29.3 MG/DL
HGB BLD-MCNC: 15.8 G/DL (ref 13.5–17.5)
LDLC SERPL CALC-MCNC: ABNORMAL MG/DL
MCH RBC QN AUTO: 30.7 PG (ref 26–34)
MCHC RBC AUTO-ENTMCNC: 34.2 G/DL (ref 32–36)
MCV RBC AUTO: 90 FL (ref 80–100)
NON HDL CHOLESTEROL: 126 MG/DL (ref 0–149)
NRBC BLD-RTO: 0 /100 WBCS (ref 0–0)
PLATELET # BLD AUTO: 256 X10*3/UL (ref 150–450)
POTASSIUM SERPL-SCNC: 4.5 MMOL/L (ref 3.5–5.3)
PROT SERPL-MCNC: 7.8 G/DL (ref 6.4–8.2)
RBC # BLD AUTO: 5.14 X10*6/UL (ref 4.5–5.9)
SODIUM SERPL-SCNC: 142 MMOL/L (ref 136–145)
TRIGL SERPL-MCNC: 515 MG/DL (ref 0–149)
VLDL: ABNORMAL
WBC # BLD AUTO: 6.6 X10*3/UL (ref 4.4–11.3)

## 2024-02-12 PROCEDURE — 86140 C-REACTIVE PROTEIN: CPT

## 2024-02-12 PROCEDURE — 83036 HEMOGLOBIN GLYCOSYLATED A1C: CPT

## 2024-02-12 PROCEDURE — 80053 COMPREHEN METABOLIC PANEL: CPT

## 2024-02-12 PROCEDURE — 36415 COLL VENOUS BLD VENIPUNCTURE: CPT

## 2024-02-12 PROCEDURE — 85027 COMPLETE CBC AUTOMATED: CPT

## 2024-02-12 PROCEDURE — 85652 RBC SED RATE AUTOMATED: CPT

## 2024-02-12 PROCEDURE — 99213 OFFICE O/P EST LOW 20 MIN: CPT | Performed by: INTERNAL MEDICINE

## 2024-02-12 PROCEDURE — 1036F TOBACCO NON-USER: CPT | Performed by: INTERNAL MEDICINE

## 2024-02-12 PROCEDURE — 3074F SYST BP LT 130 MM HG: CPT | Performed by: INTERNAL MEDICINE

## 2024-02-12 PROCEDURE — 80061 LIPID PANEL: CPT

## 2024-02-12 PROCEDURE — 3079F DIAST BP 80-89 MM HG: CPT | Performed by: INTERNAL MEDICINE

## 2024-02-12 RX ORDER — HYDROXYCHLOROQUINE SULFATE 200 MG/1
200 TABLET, FILM COATED ORAL 2 TIMES DAILY
Qty: 180 TABLET | Refills: 0 | Status: SHIPPED | OUTPATIENT
Start: 2024-02-12 | End: 2024-02-15 | Stop reason: SDUPTHER

## 2024-02-12 RX ORDER — NIFEDIPINE 30 MG/1
30 TABLET, FILM COATED, EXTENDED RELEASE ORAL
Qty: 30 TABLET | Refills: 2 | Status: SHIPPED | OUTPATIENT
Start: 2024-02-12 | End: 2024-05-15

## 2024-02-12 RX ORDER — HYDROXYCHLOROQUINE SULFATE 200 MG/1
200 TABLET, FILM COATED ORAL 2 TIMES DAILY
COMMUNITY
Start: 2024-02-05 | End: 2024-02-12 | Stop reason: SDUPTHER

## 2024-02-12 NOTE — PATIENT INSTRUCTIONS
Stop amlodipine.  Take nifedipine once a day.  Continue hydroxychloroquine twice a day.  Call me if any question.  Follow-up in 3 months.

## 2024-02-12 NOTE — PROGRESS NOTES
Subjective . Chacho William is a 43 y.o. male who presents for Rheumatoid Arthritis and Follow-up.    HPI. 43-year-old male with history of CTD, Raynaud's phenomena, chronic low back pain with radiculopathy, gout, psoriasis, anxiety, depression, obesity and EMANI presented for follow-up.     He reports Raynaud's frequency has increased recently despite taking amlodipine regularly.    Immunosuppression:Hydroxychloroquine(8/2022)     Review of Systems   All other systems reviewed and are negative.    Objective     Blood pressure 115/81, weight 133 kg (294 lb).    Physical Exam  Gen. AAO x3, NAD.  HEENT: No pallor or icterus, PERRLA, EOMI.  No cervical lymphadenopathy .  Skin: No rashes.  Heart: S1, S2/ RRR.   Lungs: CTA B.  Abdomen: Soft, NT/ND  MSK: No swollen or tender joint.    Neuro: Sensation to touch intact.Strength 5/5 throughout.   Psych:Appropriate mood and behavior  EXT: No edema      Assessment/Plan  . 43-year-old male with history of CTD, Raynaud's phenomena, chronic low back pain with radiculopathy, gout, psoriasis, anxiety, depression, obesity and EMANI presented for follow-up.     #1: CTD/Raynaud's phenomenon.  -Discontinue amlodipine and begin nifedipine.  -Continue hydroxychloroquine twice a day.  -Labs.    Follow-up in 3 months.     This note was partially generated using the Dragon Voice recognition system. There may be some incorrect wording, spelling and/or spelling errors or punctuation errors that were not corrected prior to committing the note to the medical record.    Problem List Items Addressed This Visit       Connective tissue disease (CMS/HCC) - Primary    Relevant Medications    hydroxychloroquine (Plaquenil) 200 mg tablet    Other Relevant Orders    C-reactive protein    Sedimentation Rate    Raynaud's phenomenon without gangrene    Relevant Medications    NIFEdipine ER (Adalat CC) 30 mg 24 hr tablet            Active Ambulatory Problems     Diagnosis Date Noted    Acquired bilateral pes  cavus 05/24/2023    KATI positive 05/24/2023    Ankle pain 05/24/2023    Arthritis of ankle, left 05/24/2023    Arthritis of ankle, right 05/24/2023    Asymptomatic bunion 05/24/2023    Benign essential hypertension 05/24/2023    Connective tissue disease (CMS/HCC) 05/24/2023    Class 2 severe obesity with body mass index (BMI) of 35 to 39.9 with serious comorbidity (CMS/HCC) 05/24/2023    Chronic lumbar radiculopathy 05/24/2023    Chronic back pain greater than 3 months duration 05/24/2023    Degenerative joint disease (DJD) of lumbar spine 05/24/2023    Depression, major, single episode, moderate (CMS/[facility]) 05/24/2023    Essential hypertriglyceridemia 05/24/2023    Generalized anxiety disorder 05/24/2023    Gout 05/24/2023    Hyperlipidemia 05/24/2023    Impaired fasting glucose 05/24/2023    Low back pain with left-sided sciatica 05/24/2023    Obstructive sleep apnea 05/24/2023    Peripheral vascular disorder (CMS/HCC) 05/24/2023    Psoriasis 05/24/2023    Psoriatic arthritis (CMS/[facility]) 05/24/2023    Raynaud's phenomenon without gangrene 05/24/2023    Right foot pain 05/24/2023    Vitamin D deficiency 05/24/2023    Arm numbness left 10/07/2023    Obesity 10/02/2019    Thyroid disorder screen 12/18/2023    Severe obesity (BMI 35.0-39.9) with comorbidity (CMS/[facility]) 12/18/2023     Resolved Ambulatory Problems     Diagnosis Date Noted    Morbid obesity (CMS/HCC) 05/24/2023    Diverticulitis of colon 05/24/2023    Right peroneal tendinosis 05/24/2023    Bilateral impacted cerumen 07/26/2023    Sepsis due to methicillin susceptible Staphylococcus aureus (CMS/[facility]) 10/02/2019    Other infective bursitis, right knee 10/02/2019    Upper respiratory tract infection 11/14/2023     Past Medical History:   Diagnosis Date    Personal history of other diseases of the circulatory system     Personal history of other mental and behavioral disorders     Personal history of other specified conditions        Family History   Problem  Relation Name Age of Onset    Other (htn) Mother      Breast cancer Mother      Lung cancer Father         Past Surgical History:   Procedure Laterality Date    OTHER SURGICAL HISTORY  10/15/2019    Tonsillectomy    OTHER SURGICAL HISTORY  10/15/2019    Back surgery    OTHER SURGICAL HISTORY  10/15/2019    Laminectomy    OTHER SURGICAL HISTORY  10/18/2019    Knee surgery    OTHER SURGICAL HISTORY  10/18/2019    Foot fracture repair       Social History     Tobacco Use   Smoking Status Never   Smokeless Tobacco Never       Allergies  Nsaids (non-steroidal anti-inflammatory drug)    Current Meds  Current Outpatient Medications   Medication Instructions    busPIRone (BUSPAR) 15 mg, oral, 2 times daily    cyclobenzaprine (FLEXERIL) 10 mg, oral, 2 times daily PRN    fenofibrate (TRIGLIDE) 160 mg, oral, Daily    FLUoxetine (PROZAC) 40 mg, oral, Daily    hydroxychloroquine (PLAQUENIL) 200 mg, oral, 2 times daily    lisinopril 20 mg, oral, Daily    NIFEdipine ER (ADALAT CC) 30 mg, oral, Daily before breakfast, Do not crush, chew, or split.        Lab Results   Component Value Date    ANATITER 1:2560 07/18/2022    RF <10 07/18/2022    SEDRATE 18 (H) 10/09/2023    CRP 0.17 10/09/2023    URICACID 7.6 (H) 08/15/2022    DNADS <1.0 05/22/2023    CKTOTAL 111 05/22/2023        Jay Martines MD

## 2024-02-15 ENCOUNTER — TELEPHONE (OUTPATIENT)
Dept: PRIMARY CARE | Facility: CLINIC | Age: 44
End: 2024-02-15
Payer: COMMERCIAL

## 2024-02-15 DIAGNOSIS — M35.9 CONNECTIVE TISSUE DISEASE (MULTI): ICD-10-CM

## 2024-02-15 DIAGNOSIS — E78.5 HYPERLIPIDEMIA, UNSPECIFIED HYPERLIPIDEMIA TYPE: ICD-10-CM

## 2024-02-15 DIAGNOSIS — F32.1 DEPRESSION, MAJOR, SINGLE EPISODE, MODERATE (MULTI): ICD-10-CM

## 2024-02-15 DIAGNOSIS — I10 BENIGN ESSENTIAL HYPERTENSION: ICD-10-CM

## 2024-02-15 RX ORDER — LISINOPRIL 20 MG/1
20 TABLET ORAL DAILY
Qty: 30 TABLET | Refills: 11 | Status: SHIPPED | OUTPATIENT
Start: 2024-02-15 | End: 2025-02-14

## 2024-02-15 RX ORDER — BUSPIRONE HYDROCHLORIDE 15 MG/1
15 TABLET ORAL 2 TIMES DAILY
Qty: 60 TABLET | Refills: 11 | Status: SHIPPED | OUTPATIENT
Start: 2024-02-15 | End: 2025-02-14

## 2024-02-15 RX ORDER — FLUOXETINE HYDROCHLORIDE 40 MG/1
40 CAPSULE ORAL DAILY
Qty: 30 CAPSULE | Refills: 0 | OUTPATIENT
Start: 2024-02-15

## 2024-02-15 RX ORDER — BUSPIRONE HYDROCHLORIDE 15 MG/1
15 TABLET ORAL 2 TIMES DAILY
Qty: 60 TABLET | Refills: 0 | OUTPATIENT
Start: 2024-02-15

## 2024-02-15 RX ORDER — LISINOPRIL 20 MG/1
20 TABLET ORAL DAILY
Qty: 30 TABLET | Refills: 0 | OUTPATIENT
Start: 2024-02-15

## 2024-02-15 RX ORDER — FENOFIBRATE 160 MG/1
160 TABLET ORAL DAILY
Qty: 30 TABLET | Refills: 11 | Status: SHIPPED | OUTPATIENT
Start: 2024-02-15 | End: 2025-02-14

## 2024-02-15 RX ORDER — FLUOXETINE HYDROCHLORIDE 40 MG/1
40 CAPSULE ORAL DAILY
Qty: 30 CAPSULE | Refills: 11 | Status: SHIPPED | OUTPATIENT
Start: 2024-02-15 | End: 2025-02-14

## 2024-02-15 RX ORDER — HYDROXYCHLOROQUINE SULFATE 200 MG/1
200 TABLET, FILM COATED ORAL 2 TIMES DAILY
Qty: 180 TABLET | Refills: 0 | Status: SHIPPED | OUTPATIENT
Start: 2024-02-15 | End: 2024-05-15 | Stop reason: SDUPTHER

## 2024-02-15 NOTE — TELEPHONE ENCOUNTER
Rx Refill Request Telephone Encounter    Name:  Chacho William  :  [id number]  Medication Name:    Patient is completely out of these medications   busPIRone (Buspar) 15 mg tablet   Route: Take 1 tablet (15 mg) by mouth 2 times a day    fenofibrate (Triglide) 160 mg tablet   Route: Take 1 tablet (160 mg) by mouth once daily.    FLUoxetine (PROzac) 40 mg capsule   Route: Take 1 capsule (40 mg) by mouth once daily.    lisinopril 20 mg tablet   Route: Take 1 tablet (20 mg) by mouth once daily.        Specific Pharmacy location:  [id number] [place] [address]   Date of last appointment:  23  Date of next appointment:  4/15/24  Best number to reach patient: [phone]

## 2024-03-14 DIAGNOSIS — R76.8 ANA POSITIVE: ICD-10-CM

## 2024-03-14 DIAGNOSIS — M19.072 ARTHRITIS OF ANKLE, LEFT: ICD-10-CM

## 2024-03-14 DIAGNOSIS — M25.579 ANKLE PAIN, UNSPECIFIED CHRONICITY, UNSPECIFIED LATERALITY: ICD-10-CM

## 2024-03-14 DIAGNOSIS — M19.071 ARTHRITIS OF ANKLE, RIGHT: ICD-10-CM

## 2024-04-08 RX ORDER — CELECOXIB 200 MG/1
CAPSULE ORAL
Qty: 60 CAPSULE | Refills: 5 | Status: SHIPPED | OUTPATIENT
Start: 2024-04-08 | End: 2024-04-11

## 2024-04-11 ENCOUNTER — LAB (OUTPATIENT)
Dept: LAB | Facility: LAB | Age: 44
End: 2024-04-11
Payer: COMMERCIAL

## 2024-04-11 DIAGNOSIS — Z13.29 THYROID DISORDER SCREEN: ICD-10-CM

## 2024-04-11 DIAGNOSIS — E55.9 VITAMIN D DEFICIENCY: ICD-10-CM

## 2024-04-11 DIAGNOSIS — R73.01 IMPAIRED FASTING GLUCOSE: ICD-10-CM

## 2024-04-11 DIAGNOSIS — I10 BENIGN ESSENTIAL HYPERTENSION: ICD-10-CM

## 2024-04-11 DIAGNOSIS — E78.5 HYPERLIPIDEMIA, UNSPECIFIED HYPERLIPIDEMIA TYPE: ICD-10-CM

## 2024-04-11 LAB
25(OH)D3 SERPL-MCNC: 21 NG/ML (ref 30–100)
ALBUMIN SERPL BCP-MCNC: 4.4 G/DL (ref 3.4–5)
ALP SERPL-CCNC: 63 U/L (ref 33–120)
ALT SERPL W P-5'-P-CCNC: 20 U/L (ref 10–52)
ANION GAP SERPL CALC-SCNC: 9 MMOL/L (ref 10–20)
AST SERPL W P-5'-P-CCNC: 17 U/L (ref 9–39)
BILIRUB SERPL-MCNC: 0.4 MG/DL (ref 0–1.2)
BUN SERPL-MCNC: 16 MG/DL (ref 6–23)
CALCIUM SERPL-MCNC: 9.5 MG/DL (ref 8.6–10.3)
CHLORIDE SERPL-SCNC: 105 MMOL/L (ref 98–107)
CHOLEST SERPL-MCNC: 129 MG/DL (ref 0–199)
CHOLESTEROL/HDL RATIO: 4.8
CO2 SERPL-SCNC: 29 MMOL/L (ref 21–32)
CREAT SERPL-MCNC: 0.83 MG/DL (ref 0.5–1.3)
EGFRCR SERPLBLD CKD-EPI 2021: >90 ML/MIN/1.73M*2
ERYTHROCYTE [DISTWIDTH] IN BLOOD BY AUTOMATED COUNT: 12.8 % (ref 11.5–14.5)
GLUCOSE SERPL-MCNC: 87 MG/DL (ref 74–99)
HCT VFR BLD AUTO: 45.3 % (ref 41–52)
HDLC SERPL-MCNC: 26.6 MG/DL
HGB BLD-MCNC: 15.3 G/DL (ref 13.5–17.5)
LDLC SERPL CALC-MCNC: 56 MG/DL
LDLC SERPL DIRECT ASSAY-MCNC: 77 MG/DL (ref 0–129)
MCH RBC QN AUTO: 29.8 PG (ref 26–34)
MCHC RBC AUTO-ENTMCNC: 33.8 G/DL (ref 32–36)
MCV RBC AUTO: 88 FL (ref 80–100)
NON HDL CHOLESTEROL: 102 MG/DL (ref 0–149)
NRBC BLD-RTO: 0 /100 WBCS (ref 0–0)
PLATELET # BLD AUTO: 275 X10*3/UL (ref 150–450)
POTASSIUM SERPL-SCNC: 4.2 MMOL/L (ref 3.5–5.3)
PROT SERPL-MCNC: 7.8 G/DL (ref 6.4–8.2)
RBC # BLD AUTO: 5.13 X10*6/UL (ref 4.5–5.9)
SODIUM SERPL-SCNC: 139 MMOL/L (ref 136–145)
TRIGL SERPL-MCNC: 234 MG/DL (ref 0–149)
TSH SERPL-ACNC: 1.13 MIU/L (ref 0.44–3.98)
VLDL: 47 MG/DL (ref 0–40)
WBC # BLD AUTO: 7.1 X10*3/UL (ref 4.4–11.3)

## 2024-04-11 PROCEDURE — 80053 COMPREHEN METABOLIC PANEL: CPT

## 2024-04-11 PROCEDURE — 36415 COLL VENOUS BLD VENIPUNCTURE: CPT

## 2024-04-11 PROCEDURE — 82306 VITAMIN D 25 HYDROXY: CPT

## 2024-04-11 PROCEDURE — 80061 LIPID PANEL: CPT

## 2024-04-11 PROCEDURE — 83721 ASSAY OF BLOOD LIPOPROTEIN: CPT

## 2024-04-11 PROCEDURE — 85027 COMPLETE CBC AUTOMATED: CPT

## 2024-04-11 PROCEDURE — 83036 HEMOGLOBIN GLYCOSYLATED A1C: CPT

## 2024-04-11 PROCEDURE — 84443 ASSAY THYROID STIM HORMONE: CPT

## 2024-04-11 RX ORDER — CELECOXIB 200 MG/1
CAPSULE ORAL
Qty: 60 CAPSULE | Refills: 5 | Status: SHIPPED | OUTPATIENT
Start: 2024-04-11

## 2024-04-12 LAB
EST. AVERAGE GLUCOSE BLD GHB EST-MCNC: 126 MG/DL
HBA1C MFR BLD: 6 %

## 2024-04-15 ENCOUNTER — OFFICE VISIT (OUTPATIENT)
Dept: PRIMARY CARE | Facility: CLINIC | Age: 44
End: 2024-04-15
Payer: COMMERCIAL

## 2024-04-15 VITALS
SYSTOLIC BLOOD PRESSURE: 111 MMHG | HEIGHT: 74 IN | OXYGEN SATURATION: 96 % | WEIGHT: 295 LBS | TEMPERATURE: 98.2 F | HEART RATE: 86 BPM | DIASTOLIC BLOOD PRESSURE: 76 MMHG | BODY MASS INDEX: 37.86 KG/M2 | RESPIRATION RATE: 14 BRPM

## 2024-04-15 DIAGNOSIS — E78.5 HYPERLIPIDEMIA, UNSPECIFIED HYPERLIPIDEMIA TYPE: ICD-10-CM

## 2024-04-15 DIAGNOSIS — R73.01 IMPAIRED FASTING GLUCOSE: ICD-10-CM

## 2024-04-15 DIAGNOSIS — E66.01 SEVERE OBESITY (BMI 35.0-39.9) WITH COMORBIDITY (MULTI): ICD-10-CM

## 2024-04-15 DIAGNOSIS — H61.23 BILATERAL IMPACTED CERUMEN: ICD-10-CM

## 2024-04-15 DIAGNOSIS — I10 BENIGN ESSENTIAL HYPERTENSION: ICD-10-CM

## 2024-04-15 DIAGNOSIS — M35.9 CONNECTIVE TISSUE DISEASE (MULTI): ICD-10-CM

## 2024-04-15 DIAGNOSIS — F32.1 DEPRESSION, MAJOR, SINGLE EPISODE, MODERATE (MULTI): Primary | ICD-10-CM

## 2024-04-15 DIAGNOSIS — F41.1 GENERALIZED ANXIETY DISORDER: ICD-10-CM

## 2024-04-15 DIAGNOSIS — E78.1 ESSENTIAL HYPERTRIGLYCERIDEMIA: ICD-10-CM

## 2024-04-15 DIAGNOSIS — M47.896 OTHER OSTEOARTHRITIS OF SPINE, LUMBAR REGION: ICD-10-CM

## 2024-04-15 DIAGNOSIS — L40.50 PSORIATIC ARTHRITIS (MULTI): ICD-10-CM

## 2024-04-15 PROBLEM — M25.579 ANKLE PAIN: Status: RESOLVED | Noted: 2023-05-24 | Resolved: 2024-04-15

## 2024-04-15 PROBLEM — E66.812 CLASS 2 SEVERE OBESITY WITH BODY MASS INDEX (BMI) OF 35 TO 39.9 WITH SERIOUS COMORBIDITY: Status: RESOLVED | Noted: 2023-05-24 | Resolved: 2024-04-15

## 2024-04-15 PROBLEM — R20.0 ARM NUMBNESS LEFT: Status: RESOLVED | Noted: 2023-10-07 | Resolved: 2024-04-15

## 2024-04-15 PROBLEM — M54.42 LOW BACK PAIN WITH LEFT-SIDED SCIATICA: Status: RESOLVED | Noted: 2023-05-24 | Resolved: 2024-04-15

## 2024-04-15 PROCEDURE — 99214 OFFICE O/P EST MOD 30 MIN: CPT | Performed by: FAMILY MEDICINE

## 2024-04-15 PROCEDURE — 3078F DIAST BP <80 MM HG: CPT | Performed by: FAMILY MEDICINE

## 2024-04-15 PROCEDURE — 1036F TOBACCO NON-USER: CPT | Performed by: FAMILY MEDICINE

## 2024-04-15 PROCEDURE — 69209 REMOVE IMPACTED EAR WAX UNI: CPT | Performed by: FAMILY MEDICINE

## 2024-04-15 PROCEDURE — 3074F SYST BP LT 130 MM HG: CPT | Performed by: FAMILY MEDICINE

## 2024-04-15 RX ORDER — CYCLOBENZAPRINE HCL 10 MG
10 TABLET ORAL 2 TIMES DAILY PRN
Qty: 60 TABLET | Refills: 5 | Status: SHIPPED | OUTPATIENT
Start: 2024-04-15 | End: 2025-04-15

## 2024-04-15 ASSESSMENT — ENCOUNTER SYMPTOMS
ARTHRALGIAS: 0
ABDOMINAL PAIN: 0
SHORTNESS OF BREATH: 0
CHILLS: 0
CHEST TIGHTNESS: 0
FEVER: 0
CONFUSION: 0
PALPITATIONS: 0

## 2024-04-15 ASSESSMENT — COLUMBIA-SUICIDE SEVERITY RATING SCALE - C-SSRS
2. HAVE YOU ACTUALLY HAD ANY THOUGHTS OF KILLING YOURSELF?: NO
1. IN THE PAST MONTH, HAVE YOU WISHED YOU WERE DEAD OR WISHED YOU COULD GO TO SLEEP AND NOT WAKE UP?: NO
6. HAVE YOU EVER DONE ANYTHING, STARTED TO DO ANYTHING, OR PREPARED TO DO ANYTHING TO END YOUR LIFE?: NO

## 2024-04-15 ASSESSMENT — PATIENT HEALTH QUESTIONNAIRE - PHQ9
2. FEELING DOWN, DEPRESSED OR HOPELESS: NOT AT ALL
SUM OF ALL RESPONSES TO PHQ9 QUESTIONS 1 AND 2: 0
1. LITTLE INTEREST OR PLEASURE IN DOING THINGS: NOT AT ALL

## 2024-04-15 NOTE — PROGRESS NOTES
"Subjective   Patient ID: Chacho William is a 43 y.o. male who presents for Annual Exam (4 month).    HPI patient today for follow-up of ongoing healthcare issues and review of recent labs with most part states has been doing okay.  Continues to work with his rheumatologist they recently took him off the Norvasc and put him on nifedipine for his Raynaud's.  Feels that his works a little better.  He admits he is not following a proper diet especially with regards to his diabetes.  Also he says he is in a new relationship for the past 4 months so far things are going well.  Does state that he is having some intimacy issues with erectile dysfunction is not sure how much of that is just because of the new relationship.    Review of Systems   Constitutional:  Negative for chills and fever.   HENT:  Negative for congestion and ear pain.    Eyes:  Negative for visual disturbance.   Respiratory:  Negative for chest tightness and shortness of breath.    Cardiovascular:  Negative for chest pain and palpitations.   Gastrointestinal:  Negative for abdominal pain.   Musculoskeletal:  Negative for arthralgias.   Skin:  Negative for pallor.   Psychiatric/Behavioral:  Negative for confusion.        Objective   /76   Pulse 86   Temp 36.8 °C (98.2 °F)   Resp 14   Ht 1.88 m (6' 2\")   Wt 134 kg (295 lb)   SpO2 96%   BMI 37.88 kg/m²     Physical Exam  Vitals and nursing note reviewed.   Constitutional:       General: He is not in acute distress.     Appearance: Normal appearance. He is not ill-appearing.   HENT:      Head: Normocephalic and atraumatic.      Right Ear: There is impacted cerumen.      Left Ear: There is impacted cerumen.      Mouth/Throat:      Pharynx: Oropharynx is clear.   Eyes:      Extraocular Movements: Extraocular movements intact.   Cardiovascular:      Rate and Rhythm: Normal rate and regular rhythm.      Pulses: Normal pulses.      Heart sounds: Normal heart sounds.   Pulmonary:      Effort: " Pulmonary effort is normal.      Breath sounds: Normal breath sounds.   Abdominal:      General: Abdomen is flat. Bowel sounds are normal.      Palpations: Abdomen is soft.      Tenderness: There is no abdominal tenderness.   Musculoskeletal:         General: Normal range of motion.      Cervical back: Neck supple.   Skin:     General: Skin is warm.   Neurological:      Mental Status: He is alert and oriented to person, place, and time. Mental status is at baseline.   Psychiatric:         Mood and Affect: Mood normal.       Recent Results (from the past 1008 hour(s))   CBC    Collection Time: 04/11/24 12:29 PM   Result Value Ref Range    WBC 7.1 4.4 - 11.3 x10*3/uL    nRBC 0.0 0.0 - 0.0 /100 WBCs    RBC 5.13 4.50 - 5.90 x10*6/uL    Hemoglobin 15.3 13.5 - 17.5 g/dL    Hematocrit 45.3 41.0 - 52.0 %    MCV 88 80 - 100 fL    MCH 29.8 26.0 - 34.0 pg    MCHC 33.8 32.0 - 36.0 g/dL    RDW 12.8 11.5 - 14.5 %    Platelets 275 150 - 450 x10*3/uL   Comprehensive Metabolic Panel    Collection Time: 04/11/24 12:29 PM   Result Value Ref Range    Glucose 87 74 - 99 mg/dL    Sodium 139 136 - 145 mmol/L    Potassium 4.2 3.5 - 5.3 mmol/L    Chloride 105 98 - 107 mmol/L    Bicarbonate 29 21 - 32 mmol/L    Anion Gap 9 (L) 10 - 20 mmol/L    Urea Nitrogen 16 6 - 23 mg/dL    Creatinine 0.83 0.50 - 1.30 mg/dL    eGFR >90 >60 mL/min/1.73m*2    Calcium 9.5 8.6 - 10.3 mg/dL    Albumin 4.4 3.4 - 5.0 g/dL    Alkaline Phosphatase 63 33 - 120 U/L    Total Protein 7.8 6.4 - 8.2 g/dL    AST 17 9 - 39 U/L    Bilirubin, Total 0.4 0.0 - 1.2 mg/dL    ALT 20 10 - 52 U/L   Cholesterol, LDL Direct    Collection Time: 04/11/24 12:29 PM   Result Value Ref Range    LDL, Direct 77 0 - 129 mg/dL   Lipid Panel    Collection Time: 04/11/24 12:29 PM   Result Value Ref Range    Cholesterol 129 0 - 199 mg/dL    HDL-Cholesterol 26.6 mg/dL    Cholesterol/HDL Ratio 4.8     LDL Calculated 56 <=99 mg/dL    VLDL 47 (H) 0 - 40 mg/dL    Triglycerides 234 (H) 0 - 149 mg/dL     Non HDL Cholesterol 102 0 - 149 mg/dL   Vitamin D 25-Hydroxy,Total (for eval of Vitamin D levels)    Collection Time: 04/11/24 12:29 PM   Result Value Ref Range    Vitamin D, 25-Hydroxy, Total 21 (L) 30 - 100 ng/mL   TSH with reflex to Free T4 if abnormal    Collection Time: 04/11/24 12:29 PM   Result Value Ref Range    Thyroid Stimulating Hormone 1.13 0.44 - 3.98 mIU/L   Hemoglobin A1C    Collection Time: 04/11/24 12:29 PM   Result Value Ref Range    Hemoglobin A1C 6.0 (H) see below %    Estimated Average Glucose 126 Not Established mg/dL     Recent labs reviewed A1c is gone up slightly we reviewed diabetic diet and lifestyle modifications as well as weight loss strategies.  Bilateral ear lavage.    Continue to follow with his specialist.    We discussed erectile dysfunction medications but for now he wants to hold off    Call if there is any questions or concerns otherwise return to office in 4 months with repeat fasting labs      Assessment/Plan   Problem List Items Addressed This Visit             ICD-10-CM    Benign essential hypertension I10     Stable continue current treatment         Relevant Orders    CBC    Comprehensive Metabolic Panel    Follow Up In Primary Care - Established    Connective tissue disease (Multi) M35.9     Stable continue to follow with rheumatology along with current medication         Relevant Orders    CBC    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     Stable continue current medication         Relevant Medications    cyclobenzaprine (Flexeril) 10 mg tablet    Depression, major, single episode, moderate (Multi) - Primary F32.1     Stable continue fluoxetine 40 mg daily         Essential hypertriglyceridemia E78.1    Relevant Orders    Comprehensive Metabolic Panel    Lipid Panel    Follow Up In Primary Care - Established    Generalized anxiety disorder F41.1     Stable continue current treatment         Hyperlipidemia E78.5     Continue fenofibrate 160 mg daily  and work on dietary modification         Relevant Orders    Comprehensive Metabolic Panel    Lipid Panel    Follow Up In Primary Care - Established    Impaired fasting glucose R73.01     A1c at 6.0% reviewed diabetic diet lifestyle modifications and weight loss strategies         Relevant Orders    Comprehensive Metabolic Panel    Hemoglobin A1C    Follow Up In Primary Care - Established    Psoriatic arthritis (Multi) L40.50     Stable continue current medication follow with rheumatology         Relevant Orders    CBC    Severe obesity (BMI 35.0-39.9) with comorbidity (Multi) E66.01     Work on dietary modification in effort to lose weight         Bilateral impacted cerumen H61.23     Bilateral ear lavage and then as maintenance he can try over-the-counter hydroperoxide or Debrox as directed.         Relevant Orders    Ear Cerumen Removal

## 2024-04-15 NOTE — ASSESSMENT & PLAN NOTE
Bilateral ear lavage and then as maintenance he can try over-the-counter hydroperoxide or Debrox as directed.

## 2024-04-15 NOTE — PROGRESS NOTES
Patient ID: Chacho William is a 43 y.o. male.    Ear Cerumen Removal    Date/Time: 4/15/2024 5:26 PM    Performed by: Doris Hartley LPN  Authorized by: Justice Vargas MD    Consent:     Consent obtained:  Verbal    Consent given by:  Patient    Risks, benefits, and alternatives were discussed: yes    Universal protocol:     Procedure explained and questions answered to patient or proxy's satisfaction: yes      Patient identity confirmed:  Verbally with patient  Procedure details:     Location:  L ear and R ear    Procedure type: irrigation      Procedure outcomes: cerumen removed    Post-procedure details:     Inspection:  Ear canal clear    Hearing quality:  Improved    Procedure completion:  Tolerated well, no immediate complications

## 2024-05-15 ENCOUNTER — OFFICE VISIT (OUTPATIENT)
Dept: RHEUMATOLOGY | Facility: CLINIC | Age: 44
End: 2024-05-15
Payer: COMMERCIAL

## 2024-05-15 ENCOUNTER — LAB (OUTPATIENT)
Dept: LAB | Facility: LAB | Age: 44
End: 2024-05-15
Payer: COMMERCIAL

## 2024-05-15 VITALS — SYSTOLIC BLOOD PRESSURE: 133 MMHG | BODY MASS INDEX: 37.88 KG/M2 | DIASTOLIC BLOOD PRESSURE: 86 MMHG | WEIGHT: 295 LBS

## 2024-05-15 DIAGNOSIS — I73.00 RAYNAUD'S PHENOMENON WITHOUT GANGRENE: ICD-10-CM

## 2024-05-15 DIAGNOSIS — M35.9 CONNECTIVE TISSUE DISEASE (MULTI): Primary | ICD-10-CM

## 2024-05-15 DIAGNOSIS — M35.9 CONNECTIVE TISSUE DISEASE (MULTI): ICD-10-CM

## 2024-05-15 LAB
ALBUMIN SERPL BCP-MCNC: 4.6 G/DL (ref 3.4–5)
ALP SERPL-CCNC: 63 U/L (ref 33–120)
ALT SERPL W P-5'-P-CCNC: 28 U/L (ref 10–52)
ANION GAP SERPL CALC-SCNC: 15 MMOL/L (ref 10–20)
AST SERPL W P-5'-P-CCNC: 18 U/L (ref 9–39)
BILIRUB SERPL-MCNC: 0.4 MG/DL (ref 0–1.2)
BUN SERPL-MCNC: 20 MG/DL (ref 6–23)
C3 SERPL-MCNC: 141 MG/DL (ref 87–200)
C4 SERPL-MCNC: 40 MG/DL (ref 10–50)
CALCIUM SERPL-MCNC: 10.2 MG/DL (ref 8.6–10.6)
CHLORIDE SERPL-SCNC: 102 MMOL/L (ref 98–107)
CO2 SERPL-SCNC: 26 MMOL/L (ref 21–32)
CREAT SERPL-MCNC: 0.79 MG/DL (ref 0.5–1.3)
CRP SERPL-MCNC: <0.1 MG/DL
DSDNA AB SER-ACNC: <1 IU/ML
EGFRCR SERPLBLD CKD-EPI 2021: >90 ML/MIN/1.73M*2
ERYTHROCYTE [DISTWIDTH] IN BLOOD BY AUTOMATED COUNT: 13.7 % (ref 11.5–14.5)
ERYTHROCYTE [SEDIMENTATION RATE] IN BLOOD BY WESTERGREN METHOD: 5 MM/H (ref 0–15)
GLUCOSE SERPL-MCNC: 85 MG/DL (ref 74–99)
HCT VFR BLD AUTO: 48.8 % (ref 41–52)
HGB BLD-MCNC: 15.9 G/DL (ref 13.5–17.5)
MCH RBC QN AUTO: 29.7 PG (ref 26–34)
MCHC RBC AUTO-ENTMCNC: 32.6 G/DL (ref 32–36)
MCV RBC AUTO: 91 FL (ref 80–100)
NRBC BLD-RTO: 0 /100 WBCS (ref 0–0)
PLATELET # BLD AUTO: 255 X10*3/UL (ref 150–450)
POTASSIUM SERPL-SCNC: 4.5 MMOL/L (ref 3.5–5.3)
PROT SERPL-MCNC: 8.2 G/DL (ref 6.4–8.2)
RBC # BLD AUTO: 5.35 X10*6/UL (ref 4.5–5.9)
SODIUM SERPL-SCNC: 138 MMOL/L (ref 136–145)
URATE SERPL-MCNC: 7.9 MG/DL (ref 4–7.5)
WBC # BLD AUTO: 7.6 X10*3/UL (ref 4.4–11.3)

## 2024-05-15 PROCEDURE — 86160 COMPLEMENT ANTIGEN: CPT

## 2024-05-15 PROCEDURE — 3079F DIAST BP 80-89 MM HG: CPT | Performed by: INTERNAL MEDICINE

## 2024-05-15 PROCEDURE — 85027 COMPLETE CBC AUTOMATED: CPT

## 2024-05-15 PROCEDURE — 1036F TOBACCO NON-USER: CPT | Performed by: INTERNAL MEDICINE

## 2024-05-15 PROCEDURE — 86225 DNA ANTIBODY NATIVE: CPT

## 2024-05-15 PROCEDURE — 36415 COLL VENOUS BLD VENIPUNCTURE: CPT

## 2024-05-15 PROCEDURE — 80053 COMPREHEN METABOLIC PANEL: CPT

## 2024-05-15 PROCEDURE — 85652 RBC SED RATE AUTOMATED: CPT

## 2024-05-15 PROCEDURE — 86140 C-REACTIVE PROTEIN: CPT

## 2024-05-15 PROCEDURE — 3075F SYST BP GE 130 - 139MM HG: CPT | Performed by: INTERNAL MEDICINE

## 2024-05-15 PROCEDURE — 99213 OFFICE O/P EST LOW 20 MIN: CPT | Performed by: INTERNAL MEDICINE

## 2024-05-15 PROCEDURE — 84550 ASSAY OF BLOOD/URIC ACID: CPT

## 2024-05-15 RX ORDER — HYDROXYCHLOROQUINE SULFATE 200 MG/1
200 TABLET, FILM COATED ORAL 2 TIMES DAILY
Qty: 180 TABLET | Refills: 0 | Status: SHIPPED | OUTPATIENT
Start: 2024-05-15 | End: 2024-08-13

## 2024-05-15 NOTE — PATIENT INSTRUCTIONS
Continue hydroxychloroquine and nifedipine as prescribed.  Call me if any question.  Follow-up in 4 months.

## 2024-05-15 NOTE — PROGRESS NOTES
Subjective . Chacho William is a 43 y.o. male who presents for Follow-up.    HPI. 43-year-old male with history of CTD, Raynaud's phenomena, chronic low back pain with radiculopathy, gout, psoriasis, anxiety, depression, obesity and EMANI presented for follow-up.     He states that he is feeling fine however notes generalized stiffness at times.  Also notes ankle swelling and soreness by the end of the day.  He stated that his psoriasis has cleared since he started taking hydroxychloroquine.  Raynaud's is much better with nifedipine.    Immunosuppression:Hydroxychloroquine(8/2022)     Review of Systems   All other systems reviewed and are negative.    Objective     Blood pressure 133/86, weight 134 kg (295 lb).    Physical Exam.  Gen. AAO x3, NAD.  HEENT: No pallor or icterus, PERRLA, EOMI. Parotid glands  not enlarged. No cervical lymphadenopathy .  Skin: No rashes.  Heart: S1, S2/ RRR. No murmurs or gallops.  Lungs: CTA B.  Abdomen: Soft, NT/ND.  MSK: No.swelling or tenderness of the  upper or lower extremity joints with full ROM, Neck,spine and Ramakrishna SI with out tenderness.  Neuro: Sensation to touch intact.Strength 5/5 throughout.   Psych:Appropriate mood and behavior  EXT: No edema    Assessment/Plan . 43-year-old male with history of CTD, Raynaud's phenomena, chronic low back pain with radiculopathy, gout, psoriasis, anxiety, depression, obesity and EMANI presented for follow-up.     #1: CTD/Raynaud's phenomenon.  He is doing well.  No synovitis, enthesitis or dactylitis suggesting psoriatic arthritis.  -Continue hydroxychloroquine twice a day.  -Continue nifedipine once a day.  -Labs.    Follow-up in 4 months.     This note was partially generated using the Dragon Voice recognition system. There may be some incorrect wording, spelling and/or spelling errors or punctuation errors that were not corrected prior to committing the note to the medical record.      Problem List Items Addressed This Visit       Connective  tissue disease (Multi) - Primary    Relevant Medications    hydroxychloroquine (Plaquenil) 200 mg tablet    Other Relevant Orders    C3 complement    C4 complement    Sedimentation Rate    Uric acid    C-reactive protein    CBC    Comprehensive Metabolic Panel    Anti-DNA Antibody, Double-Stranded    Raynaud's phenomenon without gangrene    Relevant Orders    C3 complement    C4 complement    Sedimentation Rate    Uric acid    C-reactive protein    CBC    Comprehensive Metabolic Panel    Anti-DNA Antibody, Double-Stranded            Active Ambulatory Problems     Diagnosis Date Noted    Acquired bilateral pes cavus 05/24/2023    KATI positive 05/24/2023    Arthritis of ankle, left 05/24/2023    Arthritis of ankle, right 05/24/2023    Asymptomatic bunion 05/24/2023    Benign essential hypertension 05/24/2023    Connective tissue disease (Multi) 05/24/2023    Chronic lumbar radiculopathy 05/24/2023    Chronic back pain greater than 3 months duration 05/24/2023    Degenerative joint disease (DJD) of lumbar spine 05/24/2023    Depression, major, single episode, moderate (Multi) 05/24/2023    Essential hypertriglyceridemia 05/24/2023    Generalized anxiety disorder 05/24/2023    Gout 05/24/2023    Hyperlipidemia 05/24/2023    Impaired fasting glucose 05/24/2023    Obstructive sleep apnea 05/24/2023    Peripheral vascular disorder (CMS-HCC) 05/24/2023    Psoriasis 05/24/2023    Psoriatic arthritis (Multi) 05/24/2023    Raynaud's phenomenon without gangrene 05/24/2023    Right foot pain 05/24/2023    Vitamin D deficiency 05/24/2023    Thyroid disorder screen 12/18/2023    Severe obesity (BMI 35.0-39.9) with comorbidity (Multi) 12/18/2023    Bilateral impacted cerumen 04/15/2024     Resolved Ambulatory Problems     Diagnosis Date Noted    Ankle pain 05/24/2023    Class 2 severe obesity with body mass index (BMI) of 35 to 39.9 with serious comorbidity (Multi) 05/24/2023    Morbid obesity (Multi) 05/24/2023    Diverticulitis  of colon 05/24/2023    Low back pain with left-sided sciatica 05/24/2023    Right peroneal tendinosis 05/24/2023    Bilateral impacted cerumen 07/26/2023    Arm numbness left 10/07/2023    Sepsis due to methicillin susceptible Staphylococcus aureus (Multi) 10/02/2019    Other infective bursitis, right knee 10/02/2019    Upper respiratory tract infection 11/14/2023     Past Medical History:   Diagnosis Date    Personal history of other diseases of the circulatory system     Personal history of other mental and behavioral disorders     Personal history of other specified conditions        Family History   Problem Relation Name Age of Onset    Other (htn) Mother      Breast cancer Mother      Lung cancer Father         Past Surgical History:   Procedure Laterality Date    OTHER SURGICAL HISTORY  10/15/2019    Tonsillectomy    OTHER SURGICAL HISTORY  10/15/2019    Back surgery    OTHER SURGICAL HISTORY  10/15/2019    Laminectomy    OTHER SURGICAL HISTORY  10/18/2019    Knee surgery    OTHER SURGICAL HISTORY  10/18/2019    Foot fracture repair       Social History     Tobacco Use   Smoking Status Never    Passive exposure: Past   Smokeless Tobacco Never       Allergies  Nsaids (non-steroidal anti-inflammatory drug)    Current Meds  Current Outpatient Medications   Medication Instructions    busPIRone (BUSPAR) 15 mg, oral, 2 times daily    celecoxib (CeleBREX) 200 mg capsule TAKE ONE CAPSULE BY MOUTH TWICE A DAY WITH MEALS    cyclobenzaprine (FLEXERIL) 10 mg, oral, 2 times daily PRN    fenofibrate (TRIGLIDE) 160 mg, oral, Daily    FLUoxetine (PROZAC) 40 mg, oral, Daily    hydroxychloroquine (PLAQUENIL) 200 mg, oral, 2 times daily    lisinopril 20 mg, oral, Daily    NIFEdipine ER (ADALAT CC) 30 mg, oral, Daily before breakfast, Do not crush, chew, or split.        Lab Results   Component Value Date    ANATITER 1:2560 07/18/2022    RF <10 07/18/2022    SEDRATE 4 02/12/2024    CRP <0.10 02/12/2024    URICACID 7.6 (H)  08/15/2022    DNADS <1.0 05/22/2023    CKTOTAL 111 05/22/2023        Jay Martines MD

## 2024-07-01 ENCOUNTER — TELEPHONE (OUTPATIENT)
Dept: PRIMARY CARE | Facility: CLINIC | Age: 44
End: 2024-07-01
Payer: COMMERCIAL

## 2024-07-01 DIAGNOSIS — M10.00 ACUTE IDIOPATHIC GOUT, UNSPECIFIED SITE: Primary | ICD-10-CM

## 2024-07-01 RX ORDER — PREDNISONE 10 MG/1
TABLET ORAL DAILY
Qty: 30 TABLET | Refills: 0 | Status: SHIPPED | OUTPATIENT
Start: 2024-07-01 | End: 2024-07-11

## 2024-07-01 NOTE — TELEPHONE ENCOUNTER
Patient called in and reported a flare up of gout on his left foot that has been on-going for about a week. Patient has increased his water intake thinking it would help alleviate this but it has not. Patient is unsure how to combat this, please advise.

## 2024-08-16 ENCOUNTER — LAB (OUTPATIENT)
Dept: LAB | Facility: LAB | Age: 44
End: 2024-08-16
Payer: COMMERCIAL

## 2024-08-16 ENCOUNTER — TELEPHONE (OUTPATIENT)
Dept: PRIMARY CARE | Facility: CLINIC | Age: 44
End: 2024-08-16

## 2024-08-16 DIAGNOSIS — E78.5 HYPERLIPIDEMIA, UNSPECIFIED HYPERLIPIDEMIA TYPE: ICD-10-CM

## 2024-08-16 DIAGNOSIS — R73.01 IMPAIRED FASTING GLUCOSE: ICD-10-CM

## 2024-08-16 DIAGNOSIS — L40.50 PSORIATIC ARTHRITIS (MULTI): ICD-10-CM

## 2024-08-16 DIAGNOSIS — E78.1 ESSENTIAL HYPERTRIGLYCERIDEMIA: ICD-10-CM

## 2024-08-16 DIAGNOSIS — M35.9 CONNECTIVE TISSUE DISEASE (MULTI): ICD-10-CM

## 2024-08-16 DIAGNOSIS — I10 BENIGN ESSENTIAL HYPERTENSION: ICD-10-CM

## 2024-08-16 LAB
ALBUMIN SERPL BCP-MCNC: 4.6 G/DL (ref 3.4–5)
ALP SERPL-CCNC: 46 U/L (ref 33–120)
ALT SERPL W P-5'-P-CCNC: 29 U/L (ref 10–52)
ANION GAP SERPL CALC-SCNC: 14 MMOL/L (ref 10–20)
AST SERPL W P-5'-P-CCNC: 32 U/L (ref 9–39)
BILIRUB SERPL-MCNC: 0.4 MG/DL (ref 0–1.2)
BUN SERPL-MCNC: 14 MG/DL (ref 6–23)
CALCIUM SERPL-MCNC: 9.8 MG/DL (ref 8.6–10.3)
CHLORIDE SERPL-SCNC: 103 MMOL/L (ref 98–107)
CHOLEST SERPL-MCNC: 170 MG/DL (ref 0–199)
CHOLESTEROL/HDL RATIO: 5.8
CO2 SERPL-SCNC: 24 MMOL/L (ref 21–32)
CREAT SERPL-MCNC: 0.89 MG/DL (ref 0.5–1.3)
EGFRCR SERPLBLD CKD-EPI 2021: >90 ML/MIN/1.73M*2
ERYTHROCYTE [DISTWIDTH] IN BLOOD BY AUTOMATED COUNT: 13 % (ref 11.5–14.5)
EST. AVERAGE GLUCOSE BLD GHB EST-MCNC: 120 MG/DL
GLUCOSE SERPL-MCNC: 96 MG/DL (ref 74–99)
HBA1C MFR BLD: 5.8 %
HCT VFR BLD AUTO: 48.8 % (ref 41–52)
HDLC SERPL-MCNC: 29.1 MG/DL
HGB BLD-MCNC: 16.6 G/DL (ref 13.5–17.5)
LDLC SERPL CALC-MCNC: 67 MG/DL
MCH RBC QN AUTO: 30.5 PG (ref 26–34)
MCHC RBC AUTO-ENTMCNC: 34 G/DL (ref 32–36)
MCV RBC AUTO: 90 FL (ref 80–100)
NON HDL CHOLESTEROL: 141 MG/DL (ref 0–149)
NRBC BLD-RTO: 0 /100 WBCS (ref 0–0)
PLATELET # BLD AUTO: 198 X10*3/UL (ref 150–450)
POTASSIUM SERPL-SCNC: 6.1 MMOL/L (ref 3.5–5.3)
PROT SERPL-MCNC: 8 G/DL (ref 6.4–8.2)
RBC # BLD AUTO: 5.44 X10*6/UL (ref 4.5–5.9)
SODIUM SERPL-SCNC: 135 MMOL/L (ref 136–145)
TRIGL SERPL-MCNC: 370 MG/DL (ref 0–149)
VLDL: 74 MG/DL (ref 0–40)
WBC # BLD AUTO: 7.2 X10*3/UL (ref 4.4–11.3)

## 2024-08-16 PROCEDURE — 36415 COLL VENOUS BLD VENIPUNCTURE: CPT

## 2024-08-16 PROCEDURE — 85027 COMPLETE CBC AUTOMATED: CPT

## 2024-08-16 PROCEDURE — 83036 HEMOGLOBIN GLYCOSYLATED A1C: CPT

## 2024-08-16 PROCEDURE — 80061 LIPID PANEL: CPT

## 2024-08-16 PROCEDURE — 80053 COMPREHEN METABOLIC PANEL: CPT

## 2024-08-19 ENCOUNTER — APPOINTMENT (OUTPATIENT)
Dept: PRIMARY CARE | Facility: CLINIC | Age: 44
End: 2024-08-19
Payer: COMMERCIAL

## 2024-08-19 ENCOUNTER — LAB (OUTPATIENT)
Dept: LAB | Facility: LAB | Age: 44
End: 2024-08-19
Payer: COMMERCIAL

## 2024-08-19 VITALS
SYSTOLIC BLOOD PRESSURE: 117 MMHG | HEIGHT: 73 IN | TEMPERATURE: 97 F | OXYGEN SATURATION: 95 % | WEIGHT: 310 LBS | BODY MASS INDEX: 41.08 KG/M2 | RESPIRATION RATE: 14 BRPM | HEART RATE: 80 BPM | DIASTOLIC BLOOD PRESSURE: 83 MMHG

## 2024-08-19 DIAGNOSIS — E55.9 VITAMIN D DEFICIENCY: ICD-10-CM

## 2024-08-19 DIAGNOSIS — M25.579 ANKLE PAIN, UNSPECIFIED CHRONICITY, UNSPECIFIED LATERALITY: ICD-10-CM

## 2024-08-19 DIAGNOSIS — F32.1 DEPRESSION, MAJOR, SINGLE EPISODE, MODERATE (MULTI): Primary | ICD-10-CM

## 2024-08-19 DIAGNOSIS — E87.5 HYPERKALEMIA: ICD-10-CM

## 2024-08-19 DIAGNOSIS — R73.01 IMPAIRED FASTING GLUCOSE: ICD-10-CM

## 2024-08-19 DIAGNOSIS — E78.1 ESSENTIAL HYPERTRIGLYCERIDEMIA: ICD-10-CM

## 2024-08-19 DIAGNOSIS — M19.072 ARTHRITIS OF ANKLE, LEFT: ICD-10-CM

## 2024-08-19 DIAGNOSIS — I10 BENIGN ESSENTIAL HYPERTENSION: ICD-10-CM

## 2024-08-19 DIAGNOSIS — M19.071 ARTHRITIS OF ANKLE, RIGHT: ICD-10-CM

## 2024-08-19 DIAGNOSIS — E78.5 HYPERLIPIDEMIA, UNSPECIFIED HYPERLIPIDEMIA TYPE: ICD-10-CM

## 2024-08-19 DIAGNOSIS — R53.1 LEFT-SIDED WEAKNESS: ICD-10-CM

## 2024-08-19 DIAGNOSIS — M47.896 OTHER OSTEOARTHRITIS OF SPINE, LUMBAR REGION: ICD-10-CM

## 2024-08-19 DIAGNOSIS — L40.50 PSORIATIC ARTHRITIS (MULTI): ICD-10-CM

## 2024-08-19 PROBLEM — H61.23 BILATERAL IMPACTED CERUMEN: Status: RESOLVED | Noted: 2024-04-15 | Resolved: 2024-08-19

## 2024-08-19 LAB
ANION GAP SERPL CALC-SCNC: 10 MMOL/L (ref 10–20)
BUN SERPL-MCNC: 17 MG/DL (ref 6–23)
CALCIUM SERPL-MCNC: 9.4 MG/DL (ref 8.6–10.3)
CHLORIDE SERPL-SCNC: 105 MMOL/L (ref 98–107)
CO2 SERPL-SCNC: 27 MMOL/L (ref 21–32)
CREAT SERPL-MCNC: 0.8 MG/DL (ref 0.5–1.3)
EGFRCR SERPLBLD CKD-EPI 2021: >90 ML/MIN/1.73M*2
GLUCOSE SERPL-MCNC: 98 MG/DL (ref 74–99)
POTASSIUM SERPL-SCNC: 4 MMOL/L (ref 3.5–5.3)
SODIUM SERPL-SCNC: 138 MMOL/L (ref 136–145)

## 2024-08-19 PROCEDURE — 3079F DIAST BP 80-89 MM HG: CPT | Performed by: FAMILY MEDICINE

## 2024-08-19 PROCEDURE — 36415 COLL VENOUS BLD VENIPUNCTURE: CPT

## 2024-08-19 PROCEDURE — 1036F TOBACCO NON-USER: CPT | Performed by: FAMILY MEDICINE

## 2024-08-19 PROCEDURE — 3074F SYST BP LT 130 MM HG: CPT | Performed by: FAMILY MEDICINE

## 2024-08-19 PROCEDURE — 99214 OFFICE O/P EST MOD 30 MIN: CPT | Performed by: FAMILY MEDICINE

## 2024-08-19 PROCEDURE — 80048 BASIC METABOLIC PNL TOTAL CA: CPT

## 2024-08-19 PROCEDURE — 3008F BODY MASS INDEX DOCD: CPT | Performed by: FAMILY MEDICINE

## 2024-08-19 RX ORDER — BUSPIRONE HYDROCHLORIDE 15 MG/1
15 TABLET ORAL 2 TIMES DAILY
Qty: 60 TABLET | Refills: 5 | Status: SHIPPED | OUTPATIENT
Start: 2024-08-19 | End: 2025-08-19

## 2024-08-19 RX ORDER — CELECOXIB 200 MG/1
CAPSULE ORAL
Qty: 60 CAPSULE | Refills: 5 | Status: SHIPPED | OUTPATIENT
Start: 2024-08-19

## 2024-08-19 RX ORDER — FLUOXETINE HYDROCHLORIDE 40 MG/1
40 CAPSULE ORAL DAILY
Qty: 30 CAPSULE | Refills: 5 | Status: SHIPPED | OUTPATIENT
Start: 2024-08-19 | End: 2025-08-19

## 2024-08-19 RX ORDER — LISINOPRIL 20 MG/1
20 TABLET ORAL DAILY
Qty: 30 TABLET | Refills: 5 | Status: CANCELLED | OUTPATIENT
Start: 2024-08-19 | End: 2025-08-19

## 2024-08-19 RX ORDER — CELECOXIB 200 MG/1
CAPSULE ORAL
Qty: 60 CAPSULE | Refills: 5 | Status: SHIPPED | OUTPATIENT
Start: 2024-08-19 | End: 2024-08-19

## 2024-08-19 RX ORDER — FENOFIBRATE 160 MG/1
160 TABLET ORAL DAILY
Qty: 30 TABLET | Refills: 5 | Status: SHIPPED | OUTPATIENT
Start: 2024-08-19 | End: 2025-08-19

## 2024-08-19 RX ORDER — CYCLOBENZAPRINE HCL 10 MG
10 TABLET ORAL 2 TIMES DAILY PRN
Qty: 60 TABLET | Refills: 5 | Status: SHIPPED | OUTPATIENT
Start: 2024-08-19 | End: 2025-08-19

## 2024-08-19 ASSESSMENT — ENCOUNTER SYMPTOMS
SHORTNESS OF BREATH: 0
FACIAL ASYMMETRY: 0
SEIZURES: 0
DIZZINESS: 0
HEADACHES: 0
NUMBNESS: 0
WEAKNESS: 1
CHEST TIGHTNESS: 0
FEVER: 0
LIGHT-HEADEDNESS: 0
ARTHRALGIAS: 0
TREMORS: 0
PALPITATIONS: 0
CONFUSION: 0
ABDOMINAL PAIN: 0
CHILLS: 0
SPEECH DIFFICULTY: 0

## 2024-08-19 NOTE — PROGRESS NOTES
"Subjective   Patient ID: Chacho William is a 43 y.o. male who presents for Follow-up (4 month ).    HPI patient today for follow-up of ongoing healthcare issues and review of recent lab work most part been doing okay except over the past month and a half he has noticed a progressive worsening of left-sided weakness including upper and lower extremities.  A degree of weakness is always present and gets worse as the day goes on.  To the point that he has difficulty pulling himself up from the ground rolling over in bed.  He denies any visual changes speech changes mental status changes.  He denies any unusual neck pain or back pain.    Review of Systems   Constitutional:  Negative for chills and fever.   HENT:  Negative for congestion and ear pain.    Eyes:  Negative for visual disturbance.   Respiratory:  Negative for chest tightness and shortness of breath.    Cardiovascular:  Negative for chest pain and palpitations.   Gastrointestinal:  Negative for abdominal pain.   Musculoskeletal:  Negative for arthralgias.   Skin:  Negative for pallor.   Neurological:  Positive for weakness. Negative for dizziness, tremors, seizures, syncope, facial asymmetry, speech difficulty, light-headedness, numbness and headaches.        Left-sided weakness   Psychiatric/Behavioral:  Negative for confusion.        Objective   /83   Pulse 80   Temp 36.1 °C (97 °F)   Resp 14   Ht 1.854 m (6' 1\")   Wt 141 kg (310 lb)   SpO2 95%   BMI 40.90 kg/m²     Physical Exam  Vitals and nursing note reviewed.   Constitutional:       General: He is not in acute distress.     Appearance: Normal appearance. He is not ill-appearing.   HENT:      Head: Normocephalic and atraumatic.      Right Ear: Tympanic membrane, ear canal and external ear normal.      Left Ear: Tympanic membrane, ear canal and external ear normal.      Mouth/Throat:      Pharynx: Oropharynx is clear.   Eyes:      Extraocular Movements: Extraocular movements intact. "   Cardiovascular:      Rate and Rhythm: Normal rate and regular rhythm.      Pulses: Normal pulses.      Heart sounds: Normal heart sounds.   Pulmonary:      Effort: Pulmonary effort is normal.      Breath sounds: Normal breath sounds.   Abdominal:      General: Abdomen is flat. Bowel sounds are normal.      Palpations: Abdomen is soft.      Tenderness: There is no abdominal tenderness.   Musculoskeletal:         General: Normal range of motion.      Cervical back: Neck supple.   Skin:     General: Skin is warm.   Neurological:      Mental Status: He is alert and oriented to person, place, and time. Mental status is at baseline.      Cranial Nerves: No cranial nerve deficit.      Motor: Weakness present.      Comments: Left-sided weakness compared to right side he is right sided dominant.  Needs to assist lifting left lower extremity when trying to put shoe on.   Psychiatric:         Mood and Affect: Mood normal.       Recent Results (from the past 1008 hour(s))   CBC    Collection Time: 08/16/24  9:00 AM   Result Value Ref Range    WBC 7.2 4.4 - 11.3 x10*3/uL    nRBC 0.0 0.0 - 0.0 /100 WBCs    RBC 5.44 4.50 - 5.90 x10*6/uL    Hemoglobin 16.6 13.5 - 17.5 g/dL    Hematocrit 48.8 41.0 - 52.0 %    MCV 90 80 - 100 fL    MCH 30.5 26.0 - 34.0 pg    MCHC 34.0 32.0 - 36.0 g/dL    RDW 13.0 11.5 - 14.5 %    Platelets 198 150 - 450 x10*3/uL   Comprehensive Metabolic Panel    Collection Time: 08/16/24  9:00 AM   Result Value Ref Range    Glucose 96 74 - 99 mg/dL    Sodium 135 (L) 136 - 145 mmol/L    Potassium 6.1 (HH) 3.5 - 5.3 mmol/L    Chloride 103 98 - 107 mmol/L    Bicarbonate 24 21 - 32 mmol/L    Anion Gap 14 10 - 20 mmol/L    Urea Nitrogen 14 6 - 23 mg/dL    Creatinine 0.89 0.50 - 1.30 mg/dL    eGFR >90 >60 mL/min/1.73m*2    Calcium 9.8 8.6 - 10.3 mg/dL    Albumin 4.6 3.4 - 5.0 g/dL    Alkaline Phosphatase 46 33 - 120 U/L    Total Protein 8.0 6.4 - 8.2 g/dL    AST 32 9 - 39 U/L    Bilirubin, Total 0.4 0.0 - 1.2 mg/dL     ALT 29 10 - 52 U/L   Hemoglobin A1C    Collection Time: 08/16/24  9:00 AM   Result Value Ref Range    Hemoglobin A1C 5.8 (H) see below %    Estimated Average Glucose 120 Not Established mg/dL   Lipid Panel    Collection Time: 08/16/24  9:00 AM   Result Value Ref Range    Cholesterol 170 0 - 199 mg/dL    HDL-Cholesterol 29.1 mg/dL    Cholesterol/HDL Ratio 5.8     LDL Calculated 67 <=99 mg/dL    VLDL 74 (H) 0 - 40 mg/dL    Triglycerides 370 (H) 0 - 149 mg/dL    Non HDL Cholesterol 141 0 - 149 mg/dL     Recent labs reviewed with patient overall numbers are stable triglycerides slightly elevated continue current medications and follow low-fat low-cholesterol diabetic diet return in 4 months with routine lab work    Regarding left-sided weakness check CT scan of head, neurology referral, physical therapy referral.  Call or go to ER if anything should acutely change or worsen  Return to our office in 4 weeks to reassess his case    Necessary refills provided      Assessment/Plan   Problem List Items Addressed This Visit             ICD-10-CM    Arthritis of ankle, left M19.072     Stable continue Celebrex         Relevant Medications    celecoxib (CeleBREX) 200 mg capsule    Arthritis of ankle, right M19.071     Stable continue Celebrex         Relevant Medications    celecoxib (CeleBREX) 200 mg capsule    Benign essential hypertension I10     Stable continue current medication         Relevant Orders    CBC    Comprehensive Metabolic Panel    Follow Up In Primary Care - Established    Follow Up In Primary Care - Established    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     Stable renew Flexeril         Relevant Medications    cyclobenzaprine (Flexeril) 10 mg tablet    Depression, major, single episode, moderate (Multi) - Primary F32.1     Stable continue fluoxetine 40 mg daily         Relevant Medications    busPIRone (Buspar) 15 mg tablet    FLUoxetine (PROzac) 40 mg capsule    Other Relevant Orders     Follow Up In Primary Care - Established    Essential hypertriglyceridemia E78.1     Glycerides have drifted up continue fenofibrate and work on dietary modifications         Relevant Orders    Follow Up In Primary Care - Established    Follow Up In Primary Care - Established    Hyperlipidemia E78.5     LDL cholesterol stable continue dietary modifications         Relevant Medications    fenofibrate (Triglide) 160 mg tablet    Other Relevant Orders    Comprehensive Metabolic Panel    Lipid Panel    Follow Up In Primary Care - Established    Follow Up In Primary Care - Established    Impaired fasting glucose R73.01     A1c 5.8% continue dietary modification         Relevant Orders    CBC    Comprehensive Metabolic Panel    Hemoglobin A1C    Follow Up In Primary Care - Established    Follow Up In Primary Care - Established    Psoriatic arthritis (Multi) L40.50     Stable continue to follow with rheumatology         Relevant Orders    CBC    Disability Placard    Vitamin D deficiency E55.9     Continue to monitor supplement as needed         Relevant Orders    Vitamin D 25-Hydroxy,Total (for eval of Vitamin D levels)    Hyperkalemia E87.5     Possibly secondary to hemolysis recheck BMP today         Relevant Orders    Basic Metabolic Panel    Left-sided weakness R53.1     Progressive left-sided weakness over the past month and a half check CT scan of head, neurology referral, physical therapy referral.  Return to office in 4 weeks to reassess case call if anything acutely changes or worsens.         Relevant Orders    Referral to Physical Therapy    CT head wo IV contrast    Referral to Neurology    Follow Up In Primary Care - Established     Other Visit Diagnoses         Codes    Ankle pain, unspecified chronicity, unspecified laterality     M25.579

## 2024-08-19 NOTE — ASSESSMENT & PLAN NOTE
Progressive left-sided weakness over the past month and a half check CT scan of head, neurology referral, physical therapy referral.  Return to office in 4 weeks to reassess case call if anything acutely changes or worsens.

## 2024-08-20 ENCOUNTER — TELEPHONE (OUTPATIENT)
Dept: PRIMARY CARE | Facility: CLINIC | Age: 44
End: 2024-08-20
Payer: COMMERCIAL

## 2024-08-20 NOTE — TELEPHONE ENCOUNTER
Dalia Collins from [facility] called in and stated that the CT head wo IV contrast was approved for [facility]. The authorization # is [id number]. The authorization is good from 08/19/2024-10/03/2024. The New case # for today [id number] was cancelled out as a duplicate.    This is just a FYI. Dalia Collins can be reached at [phone] with questions or concerns.

## 2024-08-20 NOTE — TELEPHONE ENCOUNTER
----- Message from Justice Vargas sent at 8/19/2024  6:17 PM EDT -----  Potassium back to normal continue with current treatment plan keep follow-up

## 2024-08-26 ENCOUNTER — APPOINTMENT (OUTPATIENT)
Dept: PHYSICAL THERAPY | Facility: CLINIC | Age: 44
End: 2024-08-26
Payer: COMMERCIAL

## 2024-08-26 ENCOUNTER — DOCUMENTATION (OUTPATIENT)
Dept: PHYSICAL THERAPY | Facility: CLINIC | Age: 44
End: 2024-08-26
Payer: COMMERCIAL

## 2024-08-26 NOTE — PROGRESS NOTES
Physical Therapy                 Therapy Communication Note    Patient Name: Chacho William  MRN: [id number]  Today's Date: 8/26/2024     Discipline: Physical Therapy    Missed Time: Cancel

## 2024-09-05 ENCOUNTER — HOSPITAL ENCOUNTER (OUTPATIENT)
Dept: RADIOLOGY | Facility: HOSPITAL | Age: 44
Discharge: HOME | End: 2024-09-05
Payer: COMMERCIAL

## 2024-09-05 DIAGNOSIS — R53.1 LEFT-SIDED WEAKNESS: ICD-10-CM

## 2024-09-05 PROCEDURE — 70450 CT HEAD/BRAIN W/O DYE: CPT

## 2024-09-16 ENCOUNTER — LAB (OUTPATIENT)
Dept: LAB | Facility: LAB | Age: 44
End: 2024-09-16
Payer: COMMERCIAL

## 2024-09-16 ENCOUNTER — APPOINTMENT (OUTPATIENT)
Dept: RHEUMATOLOGY | Facility: CLINIC | Age: 44
End: 2024-09-16
Payer: COMMERCIAL

## 2024-09-16 VITALS
HEIGHT: 75 IN | OXYGEN SATURATION: 96 % | BODY MASS INDEX: 38.79 KG/M2 | DIASTOLIC BLOOD PRESSURE: 82 MMHG | HEART RATE: 78 BPM | WEIGHT: 312 LBS | SYSTOLIC BLOOD PRESSURE: 125 MMHG

## 2024-09-16 DIAGNOSIS — M35.9 CONNECTIVE TISSUE DISEASE (MULTI): Primary | ICD-10-CM

## 2024-09-16 DIAGNOSIS — I73.00 RAYNAUD'S PHENOMENON WITHOUT GANGRENE: ICD-10-CM

## 2024-09-16 DIAGNOSIS — M35.9 CONNECTIVE TISSUE DISEASE (MULTI): ICD-10-CM

## 2024-09-16 LAB
CRP SERPL-MCNC: 0.1 MG/DL
ERYTHROCYTE [DISTWIDTH] IN BLOOD BY AUTOMATED COUNT: 13.3 % (ref 11.5–14.5)
ERYTHROCYTE [SEDIMENTATION RATE] IN BLOOD BY WESTERGREN METHOD: 14 MM/H (ref 0–15)
HCT VFR BLD AUTO: 47.1 % (ref 41–52)
HGB BLD-MCNC: 15.6 G/DL (ref 13.5–17.5)
MCH RBC QN AUTO: 30.2 PG (ref 26–34)
MCHC RBC AUTO-ENTMCNC: 33.1 G/DL (ref 32–36)
MCV RBC AUTO: 91 FL (ref 80–100)
NRBC BLD-RTO: 0 /100 WBCS (ref 0–0)
PLATELET # BLD AUTO: 223 X10*3/UL (ref 150–450)
RBC # BLD AUTO: 5.16 X10*6/UL (ref 4.5–5.9)
WBC # BLD AUTO: 5.8 X10*3/UL (ref 4.4–11.3)

## 2024-09-16 PROCEDURE — 86140 C-REACTIVE PROTEIN: CPT

## 2024-09-16 PROCEDURE — 99213 OFFICE O/P EST LOW 20 MIN: CPT | Performed by: INTERNAL MEDICINE

## 2024-09-16 PROCEDURE — 85652 RBC SED RATE AUTOMATED: CPT

## 2024-09-16 PROCEDURE — 3079F DIAST BP 80-89 MM HG: CPT | Performed by: INTERNAL MEDICINE

## 2024-09-16 PROCEDURE — 1036F TOBACCO NON-USER: CPT | Performed by: INTERNAL MEDICINE

## 2024-09-16 PROCEDURE — 3008F BODY MASS INDEX DOCD: CPT | Performed by: INTERNAL MEDICINE

## 2024-09-16 PROCEDURE — 36415 COLL VENOUS BLD VENIPUNCTURE: CPT

## 2024-09-16 PROCEDURE — 3074F SYST BP LT 130 MM HG: CPT | Performed by: INTERNAL MEDICINE

## 2024-09-16 PROCEDURE — 85027 COMPLETE CBC AUTOMATED: CPT

## 2024-09-16 RX ORDER — NIFEDIPINE 30 MG/1
30 TABLET, FILM COATED, EXTENDED RELEASE ORAL
Qty: 90 TABLET | Refills: 1 | Status: SHIPPED | OUTPATIENT
Start: 2024-09-16 | End: 2024-12-15

## 2024-09-16 RX ORDER — HYDROXYCHLOROQUINE SULFATE 200 MG/1
200 TABLET, FILM COATED ORAL 2 TIMES DAILY
Qty: 180 TABLET | Refills: 0 | Status: SHIPPED | OUTPATIENT
Start: 2024-09-16 | End: 2024-12-15

## 2024-09-16 ASSESSMENT — PATIENT HEALTH QUESTIONNAIRE - PHQ9
1. LITTLE INTEREST OR PLEASURE IN DOING THINGS: NOT AT ALL
SUM OF ALL RESPONSES TO PHQ9 QUESTIONS 1 AND 2: 0
2. FEELING DOWN, DEPRESSED OR HOPELESS: NOT AT ALL

## 2024-09-16 ASSESSMENT — PAIN SCALES - GENERAL: PAINLEVEL: 5

## 2024-09-16 ASSESSMENT — ENCOUNTER SYMPTOMS
LOSS OF SENSATION IN FEET: 1
DEPRESSION: 0
OCCASIONAL FEELINGS OF UNSTEADINESS: 1

## 2024-09-16 NOTE — PROGRESS NOTES
"Subjective  . Chacho William is a 44 y.o. male who presents for Follow-up.    HPI. 43-year-old male with history of CTD, Raynaud's phenomena, chronic low back pain with radiculopathy, gout, psoriasis, anxiety, depression, obesity and EMANI presented for follow-up.     He stated that his entire left side is weak.  He stated that he has difficulty to go upstairs.  Reports shoulder pain that radiates to the forearm with associated numbness and tingling.  Also reports chronic right foot pain.  Notes right foot swelling by the end of the day.  His CT scan of the brain is unremarkable.    Raynaud's stable with nifedipine.    Immunosuppression:Hydroxychloroquine(8/2022)     Review of Systems   All other systems reviewed and are negative.    Objective     Blood pressure 125/82, pulse 78, height 1.905 m (6' 3\"), weight 142 kg (312 lb), SpO2 96%.    Physical Exam.  Gen. AAO x3, NAD.  HEENT: No pallor or icterus, PERRLA, EOMI. Parotid glands  not enlarged. No cervical lymphadenopathy .  Skin: No rashes.  Heart: S1, S2/ RRR.   Lungs: CTA B.  Abdomen: Soft, NT/ND, BS regular.  MSK: No.swelling or tenderness of the hands, wrist, elbows and knees.  Left shoulder with slightly reduced forward flexion, extension, abduction and external rotation.  Positive Neer test.  Positive empty can test.  Neck with full range of motion.  Lumbar spine and SI joint without tenderness.  Right ankle slightly enlarged without tenderness.  Right bunions and first MTP DJD with valgus deformity.    Neuro: CN II-XII intact. Sensation to touch intact.Strength 5/5 throughout. DTR 2+ and symmetrical.  Psych:Appropriate mood and behavior  EXT: No edema    Assessment/Plan . 43-year-old male with history of CTD, Raynaud's phenomena, chronic low back pain with radiculopathy, gout, psoriasis, anxiety, depression, obesity and EMANI presented for follow-up.     #1: CTD/Raynaud's.  He is doing well.  -Continue hydroxychloroquine twice a day.  Annual eye exam " discussed.  -Continue nifedipine once a day.    #2: Left-sided weakness.  It appears he has shoulder DJD with rotator cuff impingement/tendinitis/tear.  Also has right foot DJD with bunions.  -He did not begin physical therapy due to cost.  He states that he has an appointment with primary care physician tomorrow.  -Corticosteroid injection offered however he declined.  -Advised to follow-up with orthopedist.    Follow-up in 4 months.     This note was partially generated using the Dragon Voice recognition system. There may be some incorrect wording, spelling and/or spelling errors or punctuation errors that were not corrected prior to committing the note to the medical record.    Problem List Items Addressed This Visit       Connective tissue disease (Multi)    Relevant Medications    hydroxychloroquine (Plaquenil) 200 mg tablet    Raynaud's phenomenon without gangrene    Relevant Medications    NIFEdipine ER (Adalat CC) 30 mg 24 hr tablet            Active Ambulatory Problems     Diagnosis Date Noted    Acquired bilateral pes cavus 05/24/2023    KATI positive 05/24/2023    Arthritis of ankle, left 05/24/2023    Arthritis of ankle, right 05/24/2023    Asymptomatic bunion 05/24/2023    Benign essential hypertension 05/24/2023    Connective tissue disease (Multi) 05/24/2023    Chronic lumbar radiculopathy 05/24/2023    Chronic back pain greater than 3 months duration 05/24/2023    Degenerative joint disease (DJD) of lumbar spine 05/24/2023    Depression, major, single episode, moderate (Multi) 05/24/2023    Essential hypertriglyceridemia 05/24/2023    Generalized anxiety disorder 05/24/2023    Gout 05/24/2023    Hyperlipidemia 05/24/2023    Impaired fasting glucose 05/24/2023    Obstructive sleep apnea 05/24/2023    Peripheral vascular disorder (CMS-HCC) 05/24/2023    Psoriasis 05/24/2023    Psoriatic arthritis (Multi) 05/24/2023    Raynaud's phenomenon without gangrene 05/24/2023    Right foot pain 05/24/2023     Vitamin D deficiency 05/24/2023    Thyroid disorder screen 12/18/2023    Severe obesity (BMI 35.0-39.9) with comorbidity (Multi) 12/18/2023    Hyperkalemia 08/19/2024    Left-sided weakness 08/19/2024     Resolved Ambulatory Problems     Diagnosis Date Noted    Ankle pain 05/24/2023    Class 2 severe obesity with body mass index (BMI) of 35 to 39.9 with serious comorbidity (Multi) 05/24/2023    Morbid obesity (Multi) 05/24/2023    Diverticulitis of colon 05/24/2023    Low back pain with left-sided sciatica 05/24/2023    Right peroneal tendinosis 05/24/2023    Bilateral impacted cerumen 07/26/2023    Arm numbness left 10/07/2023    Sepsis due to methicillin susceptible Staphylococcus aureus (Multi) 10/02/2019    Other infective bursitis, right knee 10/02/2019    Upper respiratory tract infection 11/14/2023    Bilateral impacted cerumen 04/15/2024     Past Medical History:   Diagnosis Date    Personal history of other diseases of the circulatory system     Personal history of other mental and behavioral disorders     Personal history of other specified conditions        Family History   Problem Relation Name Age of Onset    Other (htn) Mother      Breast cancer Mother      Lung cancer Father         Past Surgical History:   Procedure Laterality Date    OTHER SURGICAL HISTORY  10/15/2019    Tonsillectomy    OTHER SURGICAL HISTORY  10/15/2019    Back surgery    OTHER SURGICAL HISTORY  10/15/2019    Laminectomy    OTHER SURGICAL HISTORY  10/18/2019    Knee surgery    OTHER SURGICAL HISTORY  10/18/2019    Foot fracture repair       Social History     Tobacco Use   Smoking Status Never    Passive exposure: Past   Smokeless Tobacco Never       Allergies  Nsaids (non-steroidal anti-inflammatory drug)    Current Meds  Current Outpatient Medications   Medication Instructions    busPIRone (BUSPAR) 15 mg, oral, 2 times daily    celecoxib (CeleBREX) 200 mg capsule TAKE ONE CAPSULE BY MOUTH TWICE A DAY WITH MEALS     cyclobenzaprine (FLEXERIL) 10 mg, oral, 2 times daily PRN    fenofibrate (TRIGLIDE) 160 mg, oral, Daily    FLUoxetine (PROZAC) 40 mg, oral, Daily    hydroxychloroquine (PLAQUENIL) 200 mg, oral, 2 times daily    lisinopril 20 mg, oral, Daily    NIFEdipine ER (ADALAT CC) 30 mg, oral, Daily before breakfast, Do not crush, chew, or split.        Lab Results   Component Value Date    ANATITER 1:2560 07/18/2022    C3 141 05/15/2024    RF <10 07/18/2022    SEDRATE 5 05/15/2024    CRP <0.10 05/15/2024    URICACID 7.9 (H) 05/15/2024    DNADS <1.0 05/15/2024    CKTOTAL 111 05/22/2023             Jay Martines MD

## 2024-09-23 ENCOUNTER — APPOINTMENT (OUTPATIENT)
Dept: PRIMARY CARE | Facility: CLINIC | Age: 44
End: 2024-09-23
Payer: COMMERCIAL

## 2024-09-23 VITALS
DIASTOLIC BLOOD PRESSURE: 84 MMHG | TEMPERATURE: 97 F | WEIGHT: 313 LBS | BODY MASS INDEX: 39.12 KG/M2 | OXYGEN SATURATION: 95 % | SYSTOLIC BLOOD PRESSURE: 124 MMHG | RESPIRATION RATE: 14 BRPM | HEART RATE: 97 BPM

## 2024-09-23 DIAGNOSIS — R53.1 LEFT-SIDED WEAKNESS: ICD-10-CM

## 2024-09-23 DIAGNOSIS — M21.6X1 ACQUIRED BILATERAL PES CAVUS: Primary | ICD-10-CM

## 2024-09-23 DIAGNOSIS — I10 BENIGN ESSENTIAL HYPERTENSION: ICD-10-CM

## 2024-09-23 DIAGNOSIS — M19.072 ARTHRITIS OF ANKLE, LEFT: ICD-10-CM

## 2024-09-23 DIAGNOSIS — M21.6X2 ACQUIRED BILATERAL PES CAVUS: Primary | ICD-10-CM

## 2024-09-23 PROCEDURE — 3079F DIAST BP 80-89 MM HG: CPT | Performed by: FAMILY MEDICINE

## 2024-09-23 PROCEDURE — 1036F TOBACCO NON-USER: CPT | Performed by: FAMILY MEDICINE

## 2024-09-23 PROCEDURE — 3074F SYST BP LT 130 MM HG: CPT | Performed by: FAMILY MEDICINE

## 2024-09-23 PROCEDURE — 99213 OFFICE O/P EST LOW 20 MIN: CPT | Performed by: FAMILY MEDICINE

## 2024-09-23 RX ORDER — LISINOPRIL 20 MG/1
20 TABLET ORAL DAILY
Qty: 30 TABLET | Refills: 5 | Status: SHIPPED | OUTPATIENT
Start: 2024-09-23 | End: 2025-09-23

## 2024-09-23 ASSESSMENT — ENCOUNTER SYMPTOMS
ARTHRALGIAS: 0
ABDOMINAL PAIN: 0
SHORTNESS OF BREATH: 0
PALPITATIONS: 0
CHEST TIGHTNESS: 0
CHILLS: 0
FEVER: 0
CONFUSION: 0
JOINT SWELLING: 1

## 2024-09-23 NOTE — PROGRESS NOTES
Subjective   Patient ID: Chacho William is a 44 y.o. male who presents for Follow-up (1 month  weakness).    HPI patient today for follow-up regarding left-sided weakness and it has improved.  He has ongoing chronic issues though with left shoulder rheumatology who he typically sees thinks it has to do more with his rotator cuff but patient refused an injection.  Patient has ongoing issues with left ankle pain bunion of the left foot and pes deformity.  He wears AFOs.  Has seen orthopedics but is now willing to consider surgical reaction.  He plans on going back to see orthopedics.  He did not do any physical therapy due to cost issues.    Review of Systems   Constitutional:  Negative for chills and fever.   HENT:  Negative for congestion and ear pain.    Eyes:  Negative for visual disturbance.   Respiratory:  Negative for chest tightness and shortness of breath.    Cardiovascular:  Negative for chest pain and palpitations.   Gastrointestinal:  Negative for abdominal pain.   Musculoskeletal:  Positive for joint swelling. Negative for arthralgias.        Left ankle pain positive bunion left foot and left foot deformity   Skin:  Negative for pallor.   Psychiatric/Behavioral:  Negative for confusion.        Objective   /84   Pulse 97   Temp 36.1 °C (97 °F)   Resp 14   Wt 142 kg (313 lb)   SpO2 95%   BMI 39.12 kg/m²     Physical Exam  Constitutional:       Appearance: Normal appearance. He is not ill-appearing.   HENT:      Head: Normocephalic.   Cardiovascular:      Rate and Rhythm: Normal rate and regular rhythm.      Pulses: Normal pulses.      Heart sounds: Normal heart sounds.   Pulmonary:      Effort: Pulmonary effort is normal. No respiratory distress.      Breath sounds: Normal breath sounds.   Musculoskeletal:         General: Tenderness and deformity present.      Comments: Positive deformity of left foot.  Positive bunion left foot.  Positive left ankle pain with some swelling no erythema or  ecchymoses.  Peripheral pulses intact no cyanosis no edema.   Neurological:      General: No focal deficit present.      Mental Status: He is oriented to person, place, and time. Mental status is at baseline.      Cranial Nerves: No cranial nerve deficit.      Motor: No weakness.     CT scan of head negative for acute pathology.    Patient is lu follow through with Dr. Schreiber from orthopedics with regards to left foot and ankle issues and consider surgical correction.    He will call if there is any questions or concerns    Return to office for regular appointment in December with fasting labs and reassessment of his case    He refuses flu vaccine    Assessment/Plan   Problem List Items Addressed This Visit             ICD-10-CM    Acquired bilateral pes cavus - Primary M21.6X1, M21.6X2     Patient to follow-up with orthopedics         Arthritis of ankle, left M19.072     Patient to follow-up with orthopedics Dr. Schreiber who he seen previously patient thinks he is ready to consider surgical options.         Benign essential hypertension I10     Stable refill lisinopril         Relevant Medications    lisinopril 20 mg tablet    Left-sided weakness R53.1     Improved CT scan of head negative for acute pathology and he refused physical therapy due to cost.    Rheumatology thinks upper extremity symptoms may be related to rotator cuff injury.    For now patient wants to address his foot and ankle issues with orthopedics and then reassess.

## 2024-09-23 NOTE — ASSESSMENT & PLAN NOTE
Patient to follow-up with orthopedics Dr. Schreiber who he seen previously patient thinks he is ready to consider surgical options.

## 2024-09-23 NOTE — ASSESSMENT & PLAN NOTE
Improved CT scan of head negative for acute pathology and he refused physical therapy due to cost.    Rheumatology thinks upper extremity symptoms may be related to rotator cuff injury.    For now patient wants to address his foot and ankle issues with orthopedics and then reassess.

## 2024-10-28 DIAGNOSIS — G89.29 CHRONIC PAIN OF LEFT ANKLE: ICD-10-CM

## 2024-10-28 DIAGNOSIS — M79.672 LEFT FOOT PAIN: ICD-10-CM

## 2024-10-28 DIAGNOSIS — M25.572 CHRONIC PAIN OF LEFT ANKLE: ICD-10-CM

## 2024-10-29 ENCOUNTER — HOSPITAL ENCOUNTER (OUTPATIENT)
Dept: RADIOLOGY | Facility: HOSPITAL | Age: 44
Discharge: HOME | End: 2024-10-29
Payer: COMMERCIAL

## 2024-10-29 ENCOUNTER — OFFICE VISIT (OUTPATIENT)
Dept: ORTHOPEDIC SURGERY | Facility: HOSPITAL | Age: 44
End: 2024-10-29
Payer: COMMERCIAL

## 2024-10-29 VITALS — WEIGHT: 313 LBS | BODY MASS INDEX: 38.92 KG/M2 | HEIGHT: 75 IN

## 2024-10-29 DIAGNOSIS — G89.29 CHRONIC PAIN OF LEFT ANKLE: ICD-10-CM

## 2024-10-29 DIAGNOSIS — M79.672 LEFT FOOT PAIN: ICD-10-CM

## 2024-10-29 DIAGNOSIS — M20.12 HALLUX VALGUS OF LEFT FOOT: ICD-10-CM

## 2024-10-29 DIAGNOSIS — M05.771 RHEUMATOID ARTHRITIS INVOLVING BOTH FEET WITH POSITIVE RHEUMATOID FACTOR (MULTI): Primary | ICD-10-CM

## 2024-10-29 DIAGNOSIS — M05.772 RHEUMATOID ARTHRITIS INVOLVING BOTH FEET WITH POSITIVE RHEUMATOID FACTOR (MULTI): Primary | ICD-10-CM

## 2024-10-29 DIAGNOSIS — M25.572 CHRONIC PAIN OF LEFT ANKLE: ICD-10-CM

## 2024-10-29 PROCEDURE — 99214 OFFICE O/P EST MOD 30 MIN: CPT | Performed by: SPECIALIST

## 2024-10-29 PROCEDURE — 3008F BODY MASS INDEX DOCD: CPT | Performed by: SPECIALIST

## 2024-10-29 PROCEDURE — 73630 X-RAY EXAM OF FOOT: CPT | Mod: LT

## 2024-11-05 DIAGNOSIS — M35.9 CONNECTIVE TISSUE DISEASE (MULTI): Primary | ICD-10-CM

## 2024-11-07 ENCOUNTER — LAB (OUTPATIENT)
Dept: LAB | Facility: LAB | Age: 44
End: 2024-11-07
Payer: COMMERCIAL

## 2024-11-07 DIAGNOSIS — M35.9 CONNECTIVE TISSUE DISEASE (MULTI): ICD-10-CM

## 2024-11-07 PROCEDURE — 86200 CCP ANTIBODY: CPT

## 2024-11-07 PROCEDURE — 36415 COLL VENOUS BLD VENIPUNCTURE: CPT

## 2024-11-07 PROCEDURE — 86431 RHEUMATOID FACTOR QUANT: CPT

## 2024-11-08 LAB
CCP IGG SERPL-ACNC: <1 U/ML
RHEUMATOID FACT SER NEPH-ACNC: <10 IU/ML (ref 0–15)

## 2024-11-12 ENCOUNTER — TELEPHONE (OUTPATIENT)
Dept: ORTHOPEDIC SURGERY | Facility: CLINIC | Age: 44
End: 2024-11-12

## 2024-11-12 NOTE — TELEPHONE ENCOUNTER
Patient would like to know if he is a candidate for ankle replacement versus a fusion? Please advise

## 2024-12-09 ENCOUNTER — TELEMEDICINE (OUTPATIENT)
Dept: NEUROLOGY | Facility: HOSPITAL | Age: 44
End: 2024-12-09
Payer: COMMERCIAL

## 2024-12-09 DIAGNOSIS — R20.2 PARESTHESIAS: Primary | ICD-10-CM

## 2024-12-09 DIAGNOSIS — M54.12 CERVICAL RADICULOPATHY: ICD-10-CM

## 2024-12-09 DIAGNOSIS — R26.89 IMBALANCE: ICD-10-CM

## 2024-12-09 DIAGNOSIS — M54.16 LUMBAR RADICULOPATHY: ICD-10-CM

## 2024-12-09 DIAGNOSIS — R53.1 WEAKNESS: ICD-10-CM

## 2024-12-09 PROCEDURE — 1036F TOBACCO NON-USER: CPT | Performed by: PSYCHIATRY & NEUROLOGY

## 2024-12-09 PROCEDURE — 99215 OFFICE O/P EST HI 40 MIN: CPT | Performed by: PSYCHIATRY & NEUROLOGY

## 2024-12-09 NOTE — PATIENT INSTRUCTIONS
Do MRI cervical spine wo/lumbar spine wo  Do EMG/NCT LUE/LLE  Check CPK, B12, ACE level    Rtc after testing in-person

## 2024-12-09 NOTE — LETTER
"December 9, 2024     Justice Vargas MD  [address]  [facility], [address] [place] [id number]    Patient: Chacho William   YOB: 1980   Date of Visit: 12/9/2024       Dear Dr. Justice Vargas MD:    Thank you for referring Chacho William to me for evaluation. Below are my notes for this consultation.  If you have questions, please do not hesitate to call me. I look forward to following your patient along with you.       Sincerely,     Fabian Barajas MD      CC: Jay aMrtines MD  ______________________________________________________________________________________    Telehealth visit    Visit type: provider referral: Dr Vargas    PCP: Justice Vargas MD.    Subjective    Chacho William is a 44 y.o. year old right handed male who presents with Extremity Weakness.    Patient is accompanied by none.     Telehealth visit today. The patient was informed about the telehealth clinical encounter including benefits to avoiding travel, limitations of the assessment, and billing for the service. In-office care may be recommended if needed. Telehealth sessions are not being recorded and personal health information is protected. All questions were answered and verbal consent from the patient (or guardian) was obtained to proceed. Patient verbally confirmed, patient physically in [place].     HPI    Pt in vehicle, parked. Raining outside. Pt scheduled telehealth visit.    States has MCTD, psoriatric arthritis, rheumatoid arthritis. S/p 2 lumbar disc surgery, last lower back surgery 2018. Sees rheumatologist Dr Martines and Dr Vargas.    Referral was for progressive L-sided weakness but most of symptoms turned out to be numbness.    States numb on BLE. Toes are numb, both feet no sensation. Unable to tell if walking on uneven ground, L>>R.  Symptoms since 2019, happened about 6 months after lumbar spine surgery--not immediately after. Had trouble going up and down steps because \"can't pull legs " "up\". Numbness up to just below the knees per pt.    Had problem in L5-S1, \"foraminal pinching\", sx in 2008 and again in 2018. Was told \"wear and tear\".    Arms and hands fall asleep, symmetric, x1 year. Can't hold a gallon milk in front of him (not close to body). No known neck issues--has not been evaluated in past per pt. No radicular neck pain.  at work.     No numbness/weakness in trunk per pt.    Difficulty getting up from laying down position.  Balance off--looks like he's drunk but he's not.    EMG/NCT I did in 2023 (referred for testing of LUE) showed mild, chronic LLE cervical radiculopathy    He says went to [facility] and Dr Guaman did EMG/NCT of LE 6/2021, showed no neuropathy, there's mild chronic L L4-L5 radiculopathy.    Pt states symptoms getting worse and are worse now.    States L foot (ankle) having surgery in Jan 2025, it appears due to arthritis issues. Needs fusion of L ankle.      Not diabetic. No hx heavy etoh drinking.    No stroke. No sz. No migraine hx.    Head CT wo ordered by PCP, done 9/5/24, reported unremarkable.    Hba1c 4/2024 6, last 8/2024 was 5.8. Pt unaware.    Drinks about 5 beers a month. Denies smoking/street drug use.      Review of Systems   Constitutional:  Negative for appetite change, chills and fever.   HENT:  Negative for ear pain and nosebleeds.    Eyes:  Negative for discharge and itching.   Respiratory:  Negative for choking and chest tightness.    Cardiovascular:  Negative for chest pain and palpitations.   Gastrointestinal:  Negative for abdominal distention, abdominal pain and nausea.   Endocrine: Negative for cold intolerance and heat intolerance.   Genitourinary:  Negative for difficulty urinating and dysuria.   Musculoskeletal:  Negative for myalgias.   Neurological:  Negative for dizziness, seizures.     Patient Active Problem List   Diagnosis   • Acquired bilateral pes cavus   • KATI positive   • Arthritis of ankle, left   • Arthritis of ankle, " right   • Asymptomatic bunion   • Benign essential hypertension   • Connective tissue disease (Multi)   • Chronic lumbar radiculopathy   • Chronic back pain greater than 3 months duration   • Degenerative joint disease (DJD) of lumbar spine   • Depression, major, single episode, moderate (Multi)   • Essential hypertriglyceridemia   • Generalized anxiety disorder   • Gout   • Hyperlipidemia   • Impaired fasting glucose   • Obstructive sleep apnea   • Peripheral vascular disorder (CMS-[facility])   • Psoriasis   • Psoriatic arthritis (Multi)   • Raynaud's phenomenon without gangrene   • Right foot pain   • Vitamin D deficiency   • Thyroid disorder screen   • Severe obesity (BMI 35.0-39.9) with comorbidity (Multi)   • Hyperkalemia   • Left-sided weakness       Past Medical History:   Diagnosis Date   • Personal history of other diseases of the circulatory system     History of hypertension   • Personal history of other mental and behavioral disorders     History of anxiety   • Personal history of other specified conditions     History of chronic pain   • Right peroneal tendinosis 05/24/2023   • Sepsis due to methicillin susceptible Staphylococcus aureus (Multi) 10/02/2019     Past Surgical History:   Procedure Laterality Date   • OTHER SURGICAL HISTORY  10/15/2019    Tonsillectomy   • OTHER SURGICAL HISTORY  10/15/2019    Back surgery   • OTHER SURGICAL HISTORY  10/15/2019    Laminectomy   • OTHER SURGICAL HISTORY  10/18/2019    Knee surgery   • OTHER SURGICAL HISTORY  10/18/2019    Foot fracture repair     Social History     Tobacco Use   • Smoking status: Never     Passive exposure: Past   • Smokeless tobacco: Never   Substance Use Topics   • Alcohol use: Yes     Comment: 5 drinks per month sometimes     family history includes Breast cancer in his mother; Lung cancer in his father; htn in his mother.    Allergies   Allergen Reactions   • Nsaids (Non-Steroidal Anti-Inflammatory Drug) Nausea Only and Other       Current  Outpatient Medications:   •  busPIRone (Buspar) 15 mg tablet, Take 1 tablet (15 mg) by mouth 2 times a day., Disp: 60 tablet, Rfl: 5  •  celecoxib (CeleBREX) 200 mg capsule, TAKE ONE CAPSULE BY MOUTH TWICE A DAY WITH MEALS, Disp: 60 capsule, Rfl: 5  •  cyclobenzaprine (Flexeril) 10 mg tablet, Take 1 tablet (10 mg) by mouth 2 times a day as needed for muscle spasms., Disp: 60 tablet, Rfl: 5  •  fenofibrate (Triglide) 160 mg tablet, Take 1 tablet (160 mg) by mouth once daily., Disp: 30 tablet, Rfl: 5  •  FLUoxetine (PROzac) 40 mg capsule, Take 1 capsule (40 mg) by mouth once daily., Disp: 30 capsule, Rfl: 5  •  hydroxychloroquine (Plaquenil) 200 mg tablet, Take 1 tablet (200 mg) by mouth 2 times a day., Disp: 180 tablet, Rfl: 0  •  lisinopril 20 mg tablet, Take 1 tablet (20 mg) by mouth once daily., Disp: 30 tablet, Rfl: 5  •  NIFEdipine ER (Adalat CC) 30 mg 24 hr tablet, Take 1 tablet (30 mg) by mouth once daily in the morning. Take before meals. Do not crush, chew, or split., Disp: 90 tablet, Rfl: 1    Objective    Vital Signs:  None--telehealth visit    Awake, alert, oriented x3, in no distress  Well-nourished, seated    Mental status exam as above, conversant   Fair fund of knowledge  Recent/remote memory fair  Fair attention span  Full EOMs intact, no nystagmus, no ptosis   No facial droop   Hearing grossly intact   No dysarthria    Unable to assess strength/Romberg/gait--pt in vehicle, raining outside      Lab Results   Component Value Date    WBC 5.8 09/16/2024    RBC 5.16 09/16/2024    HGB 15.6 09/16/2024    HCT 47.1 09/16/2024     09/16/2024     08/19/2024    K 4.0 08/19/2024     08/19/2024    BUN 17 08/19/2024    CREATININE 0.80 08/19/2024    EGFR >90 08/19/2024    CALCIUM 9.4 08/19/2024    ALKPHOS 46 08/16/2024    AST 32 08/16/2024    ALT 29 08/16/2024    MG 1.88 10/06/2019    VITD25 21 (L) 04/11/2024    HGBA1C 5.8 (H) 08/16/2024    LDLDIRECT 77 04/11/2024    LDLCALC 67 08/16/2024    CHOL  170 08/16/2024    HDL 29.1 08/16/2024    TRIG 370 (H) 08/16/2024    TSH 1.13 04/11/2024        CT head wo IV contrast 09/05/2024    Narrative  Interpreted By:  Gennaro Hedrick and Hooper Grayson  STUDY:  [id number]  CT HEAD WO IV CONTRAST    INDICATION:  Signs/Symptoms:left sided weakness    COMPARISON:  CT of the head obtained October 23, 2010    ACCESSION NUMBER(S):  [id number]    ORDERING CLINICIAN:  SIERRA HALEY    TECHNIQUE:  Noncontrast helical CT of the head was performed. Multiplanar  reformations in bone and soft tissue algorithm were provided.    FINDINGS:  Midline brain structures are intact and age appropriate.    There is no loss of gray-white differentiation.    No evidence of acute intracranial hemorrhage.    No intracranial mass or mass effect.    No midline shift or herniation.    No ventriculomegaly. Normal appearance of the basilar cisterns.    Normal CT appearances of the paranasal sinuses. Orbits and globes are  normal in CT appearance. No mastoid effusion. No acute or aggressive  appearing calvarial lesion.    Impression  Normal CT of the head.    I personally reviewed the images/study and I agree with the findings  as stated. This study was interpreted at [place], [place].    Signed by: Gennaro Hedrick 9/6/2024 11:30 AM  Dictation workstation:   [id number]      Assessment/Plan    Unable to fully examine. Hx l-spine surgery x2. At least 5 year history of BLE numbness/weakness L>>R, and 1 year history of BUE numbness/weakness (symmetric). Prior EMG/NCT LUE showed mild chronic L cervical radiculopathy, and prior EMG/NCT LLE showed mild chronic L L4-L5 radiculopathy. No face/head/trunk/body involvement.    1. Paresthesias  2. Weakness  3. L lower cervical radiculopathy, mild and chronic  4. L L4-L5 radiculopathy, chronic  5. Imbalance    Recommendations:  Do MRI cervical spine wo/lumbar spine wo  Do EMG/NCT LUE/LLE--prefer do PRIOR to L  ankle surgery  Check CPK, B12, ACE level    Rtc after testing in-person for exam    All questions were answered.  Pt knows how to contact my office in case pt has any questions or concerns.    Fabian Barajas MD

## 2024-12-09 NOTE — PROGRESS NOTES
"Telehealth visit    Visit type: provider referral: Dr Vargas    PCP: Justice Vargas MD.    Subjective     Chacho William is a 44 y.o. year old right handed male who presents with Extremity Weakness.    Patient is accompanied by none.     Telehealth visit today. The patient was informed about the telehealth clinical encounter including benefits to avoiding travel, limitations of the assessment, and billing for the service. In-office care may be recommended if needed. Telehealth sessions are not being recorded and personal health information is protected. All questions were answered and verbal consent from the patient (or guardian) was obtained to proceed. Patient verbally confirmed, patient physically in [place].     HPI    Pt in vehicle, parked. Raining outside. Pt scheduled telehealth visit.    States has MCTD, psoriatric arthritis, rheumatoid arthritis. S/p 2 lumbar disc surgery, last lower back surgery 2018. Sees rheumatologist Dr Martines and Dr Vargas.    Referral was for progressive L-sided weakness but most of symptoms turned out to be numbness.    States numb on BLE. Toes are numb, both feet no sensation. Unable to tell if walking on uneven ground, L>>R.  Symptoms since 2019, happened about 6 months after lumbar spine surgery--not immediately after. Had trouble going up and down steps because \"can't pull legs up\". Numbness up to just below the knees per pt.    Had problem in L5-S1, \"foraminal pinching\", sx in 2008 and again in 2018. Was told \"wear and tear\".    Arms and hands fall asleep, symmetric, x1 year. Can't hold a gallon milk in front of him (not close to body). No known neck issues--has not been evaluated in past per pt. No radicular neck pain.  at work.     No numbness/weakness in trunk per pt.    Difficulty getting up from laying down position.  Balance off--looks like he's drunk but he's not.    EMG/NCT I did in 2023 (referred for testing of LUE) showed mild, chronic LLE cervical " radiculopathy    He says went to [facility] [place] Guaman did EMG/NCT of LE 6/2021, showed no neuropathy, there's mild chronic L L4-L5 radiculopathy.    Pt states symptoms getting worse and are worse now.    States L foot (ankle) having surgery in Jan 2025, it appears due to arthritis issues. Needs fusion of L ankle.      Not diabetic. No hx heavy etoh drinking.    No stroke. No sz. No migraine hx.    Head CT wo ordered by PCP, done 9/5/24, reported unremarkable.    Hba1c 4/2024 6, last 8/2024 was 5.8. Pt unaware.    Drinks about 5 beers a month. Denies smoking/street drug use.      Review of Systems   Constitutional:  Negative for appetite change, chills and fever.   HENT:  Negative for ear pain and nosebleeds.    Eyes:  Negative for discharge and itching.   Respiratory:  Negative for choking and chest tightness.    Cardiovascular:  Negative for chest pain and palpitations.   Gastrointestinal:  Negative for abdominal distention, abdominal pain and nausea.   Endocrine: Negative for cold intolerance and heat intolerance.   Genitourinary:  Negative for difficulty urinating and dysuria.   Musculoskeletal:  Negative for myalgias.   Neurological:  Negative for dizziness, seizures.     Patient Active Problem List   Diagnosis    Acquired bilateral pes cavus    KATI positive    Arthritis of ankle, left    Arthritis of ankle, right    Asymptomatic bunion    Benign essential hypertension    Connective tissue disease (Multi)    Chronic lumbar radiculopathy    Chronic back pain greater than 3 months duration    Degenerative joint disease (DJD) of lumbar spine    Depression, major, single episode, moderate (Multi)    Essential hypertriglyceridemia    Generalized anxiety disorder    Gout    Hyperlipidemia    Impaired fasting glucose    Obstructive sleep apnea    Peripheral vascular disorder (CMS-HCC)    Psoriasis    Psoriatic arthritis (Multi)    Raynaud's phenomenon without gangrene    Right foot pain    Vitamin D deficiency     Thyroid disorder screen    Severe obesity (BMI 35.0-39.9) with comorbidity (Multi)    Hyperkalemia    Left-sided weakness       Past Medical History:   Diagnosis Date    Personal history of other diseases of the circulatory system     History of hypertension    Personal history of other mental and behavioral disorders     History of anxiety    Personal history of other specified conditions     History of chronic pain    Right peroneal tendinosis 05/24/2023    Sepsis due to methicillin susceptible Staphylococcus aureus (Multi) 10/02/2019     Past Surgical History:   Procedure Laterality Date    OTHER SURGICAL HISTORY  10/15/2019    Tonsillectomy    OTHER SURGICAL HISTORY  10/15/2019    Back surgery    OTHER SURGICAL HISTORY  10/15/2019    Laminectomy    OTHER SURGICAL HISTORY  10/18/2019    Knee surgery    OTHER SURGICAL HISTORY  10/18/2019    Foot fracture repair     Social History     Tobacco Use    Smoking status: Never     Passive exposure: Past    Smokeless tobacco: Never   Substance Use Topics    Alcohol use: Yes     Comment: 5 drinks per month sometimes     family history includes Breast cancer in his mother; Lung cancer in his father; htn in his mother.    Allergies   Allergen Reactions    Nsaids (Non-Steroidal Anti-Inflammatory Drug) Nausea Only and Other       Current Outpatient Medications:     busPIRone (Buspar) 15 mg tablet, Take 1 tablet (15 mg) by mouth 2 times a day., Disp: 60 tablet, Rfl: 5    celecoxib (CeleBREX) 200 mg capsule, TAKE ONE CAPSULE BY MOUTH TWICE A DAY WITH MEALS, Disp: 60 capsule, Rfl: 5    cyclobenzaprine (Flexeril) 10 mg tablet, Take 1 tablet (10 mg) by mouth 2 times a day as needed for muscle spasms., Disp: 60 tablet, Rfl: 5    fenofibrate (Triglide) 160 mg tablet, Take 1 tablet (160 mg) by mouth once daily., Disp: 30 tablet, Rfl: 5    FLUoxetine (PROzac) 40 mg capsule, Take 1 capsule (40 mg) by mouth once daily., Disp: 30 capsule, Rfl: 5    hydroxychloroquine (Plaquenil) 200 mg  tablet, Take 1 tablet (200 mg) by mouth 2 times a day., Disp: 180 tablet, Rfl: 0    lisinopril 20 mg tablet, Take 1 tablet (20 mg) by mouth once daily., Disp: 30 tablet, Rfl: 5    NIFEdipine ER (Adalat CC) 30 mg 24 hr tablet, Take 1 tablet (30 mg) by mouth once daily in the morning. Take before meals. Do not crush, chew, or split., Disp: 90 tablet, Rfl: 1    Objective     Vital Signs:  None--telehealth visit    Awake, alert, oriented x3, in no distress  Well-nourished, seated    Mental status exam as above, conversant   Fair fund of knowledge  Recent/remote memory fair  Fair attention span  Full EOMs intact, no nystagmus, no ptosis   No facial droop   Hearing grossly intact   No dysarthria    Unable to assess strength/Romberg/gait--pt in vehicle, raining outside      Lab Results   Component Value Date    WBC 5.8 09/16/2024    RBC 5.16 09/16/2024    HGB 15.6 09/16/2024    HCT 47.1 09/16/2024     09/16/2024     08/19/2024    K 4.0 08/19/2024     08/19/2024    BUN 17 08/19/2024    CREATININE 0.80 08/19/2024    EGFR >90 08/19/2024    CALCIUM 9.4 08/19/2024    ALKPHOS 46 08/16/2024    AST 32 08/16/2024    ALT 29 08/16/2024    MG 1.88 10/06/2019    VITD25 21 (L) 04/11/2024    HGBA1C 5.8 (H) 08/16/2024    LDLDIRECT 77 04/11/2024    LDLCALC 67 08/16/2024    CHOL 170 08/16/2024    HDL 29.1 08/16/2024    TRIG 370 (H) 08/16/2024    TSH 1.13 04/11/2024        CT head wo IV contrast 09/05/2024    Narrative  Interpreted By:  Gennaro Hedrick and Hooper Grayson  STUDY:  [id number]  CT HEAD WO IV CONTRAST    INDICATION:  Signs/Symptoms:left sided weakness    COMPARISON:  CT of the head obtained October 23, 2010    ACCESSION NUMBER(S):  [id number]    ORDERING CLINICIAN:  SIERRA HALEY    TECHNIQUE:  Noncontrast helical CT of the head was performed. Multiplanar  reformations in bone and soft tissue algorithm were provided.    FINDINGS:  Midline brain structures are intact and age appropriate.    There is no  loss of gray-white differentiation.    No evidence of acute intracranial hemorrhage.    No intracranial mass or mass effect.    No midline shift or herniation.    No ventriculomegaly. Normal appearance of the basilar cisterns.    Normal CT appearances of the paranasal sinuses. Orbits and globes are  normal in CT appearance. No mastoid effusion. No acute or aggressive  appearing calvarial lesion.    Impression  Normal CT of the head.    I personally reviewed the images/study and I agree with the findings  as stated. This study was interpreted at [place], [place].    Signed by: Gennaro Hedrick 9/6/2024 11:30 AM  Dictation workstation:   [id number]      Assessment/Plan     Unable to fully examine. Hx l-spine surgery x2. At least 5 year history of BLE numbness/weakness L>>R, and 1 year history of BUE numbness/weakness (symmetric). Prior EMG/NCT LUE showed mild chronic L cervical radiculopathy, and prior EMG/NCT LLE showed mild chronic L L4-L5 radiculopathy. No face/head/trunk/body involvement.    1. Paresthesias  2. Weakness  3. L lower cervical radiculopathy, mild and chronic  4. L L4-L5 radiculopathy, chronic  5. Imbalance    Recommendations:  Do MRI cervical spine wo/lumbar spine wo  Do EMG/NCT LUE/LLE--prefer do PRIOR to L ankle surgery  Check CPK, B12, ACE level    Rtc after testing in-person for exam    All questions were answered.  Pt knows how to contact my office in case pt has any questions or concerns.    Fabian Barajas MD

## 2024-12-10 ENCOUNTER — LAB (OUTPATIENT)
Dept: LAB | Facility: LAB | Age: 44
End: 2024-12-10
Payer: COMMERCIAL

## 2024-12-10 ENCOUNTER — TELEPHONE (OUTPATIENT)
Dept: PRIMARY CARE | Facility: CLINIC | Age: 44
End: 2024-12-10

## 2024-12-10 DIAGNOSIS — R26.89 IMBALANCE: ICD-10-CM

## 2024-12-10 DIAGNOSIS — R53.1 WEAKNESS: ICD-10-CM

## 2024-12-10 DIAGNOSIS — R20.2 PARESTHESIAS: ICD-10-CM

## 2024-12-10 LAB — CK SERPL-CCNC: 201 U/L (ref 0–325)

## 2024-12-10 PROCEDURE — 82550 ASSAY OF CK (CPK): CPT

## 2024-12-10 PROCEDURE — 36415 COLL VENOUS BLD VENIPUNCTURE: CPT

## 2024-12-10 PROCEDURE — 82164 ANGIOTENSIN I ENZYME TEST: CPT

## 2024-12-10 PROCEDURE — 82607 VITAMIN B-12: CPT

## 2024-12-10 NOTE — TELEPHONE ENCOUNTER
Patient was seen by  regarding his left ankle pain and  would like to do surgery to fuse his ankle. He also saw his neurologist who advised him to hold off on the surgery until he got the MRI's and EMG sone. Patient has been unable to schedule the EMG as next available was not until April 2025. Patient is concerned because he does not want to hold off on this surgery as he believes he will be unable to walk by then. Patient would like some advise on how to go about this. Please advise.

## 2024-12-11 LAB — VIT B12 SERPL-MCNC: 538 PG/ML (ref 211–911)

## 2024-12-13 LAB — ACE SERPL-CCNC: <10 U/L (ref 16–85)

## 2024-12-13 NOTE — TELEPHONE ENCOUNTER
Spoke to Erik.  I gave him scheduling number to see if any location had a sooner appointment.  He will call next week.

## 2024-12-20 ENCOUNTER — LAB (OUTPATIENT)
Dept: LAB | Facility: LAB | Age: 44
End: 2024-12-20
Payer: COMMERCIAL

## 2024-12-20 DIAGNOSIS — I10 BENIGN ESSENTIAL HYPERTENSION: ICD-10-CM

## 2024-12-20 DIAGNOSIS — L40.50 PSORIATIC ARTHRITIS (MULTI): ICD-10-CM

## 2024-12-20 DIAGNOSIS — E78.5 HYPERLIPIDEMIA, UNSPECIFIED HYPERLIPIDEMIA TYPE: ICD-10-CM

## 2024-12-20 DIAGNOSIS — E55.9 VITAMIN D DEFICIENCY: ICD-10-CM

## 2024-12-20 DIAGNOSIS — R73.01 IMPAIRED FASTING GLUCOSE: ICD-10-CM

## 2024-12-20 LAB
25(OH)D3 SERPL-MCNC: 20 NG/ML (ref 30–100)
ALBUMIN SERPL BCP-MCNC: 4.3 G/DL (ref 3.4–5)
ALP SERPL-CCNC: 44 U/L (ref 33–120)
ALT SERPL W P-5'-P-CCNC: 30 U/L (ref 10–52)
ANION GAP SERPL CALC-SCNC: 11 MMOL/L (ref 10–20)
AST SERPL W P-5'-P-CCNC: 20 U/L (ref 9–39)
BILIRUB SERPL-MCNC: 0.4 MG/DL (ref 0–1.2)
BUN SERPL-MCNC: 15 MG/DL (ref 6–23)
CALCIUM SERPL-MCNC: 9.2 MG/DL (ref 8.6–10.3)
CHLORIDE SERPL-SCNC: 102 MMOL/L (ref 98–107)
CHOLEST SERPL-MCNC: 146 MG/DL (ref 0–199)
CHOLESTEROL/HDL RATIO: 4.8
CO2 SERPL-SCNC: 29 MMOL/L (ref 21–32)
CREAT SERPL-MCNC: 0.82 MG/DL (ref 0.5–1.3)
EGFRCR SERPLBLD CKD-EPI 2021: >90 ML/MIN/1.73M*2
ERYTHROCYTE [DISTWIDTH] IN BLOOD BY AUTOMATED COUNT: 12.8 % (ref 11.5–14.5)
EST. AVERAGE GLUCOSE BLD GHB EST-MCNC: 100 MG/DL
GLUCOSE SERPL-MCNC: 87 MG/DL (ref 74–99)
HBA1C MFR BLD: 5.1 %
HCT VFR BLD AUTO: 45.7 % (ref 41–52)
HDLC SERPL-MCNC: 30.3 MG/DL
HGB BLD-MCNC: 15.6 G/DL (ref 13.5–17.5)
LDLC SERPL CALC-MCNC: 65 MG/DL
MCH RBC QN AUTO: 30.1 PG (ref 26–34)
MCHC RBC AUTO-ENTMCNC: 34.1 G/DL (ref 32–36)
MCV RBC AUTO: 88 FL (ref 80–100)
NON HDL CHOLESTEROL: 116 MG/DL (ref 0–149)
NRBC BLD-RTO: 0 /100 WBCS (ref 0–0)
PLATELET # BLD AUTO: 232 X10*3/UL (ref 150–450)
POTASSIUM SERPL-SCNC: 4.5 MMOL/L (ref 3.5–5.3)
PROT SERPL-MCNC: 7.4 G/DL (ref 6.4–8.2)
RBC # BLD AUTO: 5.19 X10*6/UL (ref 4.5–5.9)
SODIUM SERPL-SCNC: 137 MMOL/L (ref 136–145)
TRIGL SERPL-MCNC: 252 MG/DL (ref 0–149)
VLDL: 50 MG/DL (ref 0–40)
WBC # BLD AUTO: 6.3 X10*3/UL (ref 4.4–11.3)

## 2024-12-20 PROCEDURE — 36415 COLL VENOUS BLD VENIPUNCTURE: CPT

## 2024-12-20 PROCEDURE — 80053 COMPREHEN METABOLIC PANEL: CPT

## 2024-12-20 PROCEDURE — 82306 VITAMIN D 25 HYDROXY: CPT

## 2024-12-20 PROCEDURE — 83036 HEMOGLOBIN GLYCOSYLATED A1C: CPT

## 2024-12-20 PROCEDURE — 85027 COMPLETE CBC AUTOMATED: CPT

## 2024-12-20 PROCEDURE — 80061 LIPID PANEL: CPT

## 2024-12-23 ENCOUNTER — APPOINTMENT (OUTPATIENT)
Dept: PRIMARY CARE | Facility: CLINIC | Age: 44
End: 2024-12-23
Payer: COMMERCIAL

## 2024-12-23 VITALS
WEIGHT: 314 LBS | DIASTOLIC BLOOD PRESSURE: 86 MMHG | SYSTOLIC BLOOD PRESSURE: 130 MMHG | OXYGEN SATURATION: 95 % | HEART RATE: 89 BPM | BODY MASS INDEX: 39.25 KG/M2 | RESPIRATION RATE: 14 BRPM | TEMPERATURE: 97.6 F

## 2024-12-23 DIAGNOSIS — Z13.29 THYROID DISORDER SCREEN: ICD-10-CM

## 2024-12-23 DIAGNOSIS — R73.01 IMPAIRED FASTING GLUCOSE: ICD-10-CM

## 2024-12-23 DIAGNOSIS — E78.5 HYPERLIPIDEMIA, UNSPECIFIED HYPERLIPIDEMIA TYPE: ICD-10-CM

## 2024-12-23 DIAGNOSIS — M19.071 ARTHRITIS OF ANKLE, RIGHT: ICD-10-CM

## 2024-12-23 DIAGNOSIS — F32.1 DEPRESSION, MAJOR, SINGLE EPISODE, MODERATE (MULTI): ICD-10-CM

## 2024-12-23 DIAGNOSIS — F41.1 GENERALIZED ANXIETY DISORDER: ICD-10-CM

## 2024-12-23 DIAGNOSIS — I10 BENIGN ESSENTIAL HYPERTENSION: Primary | ICD-10-CM

## 2024-12-23 DIAGNOSIS — E78.1 ESSENTIAL HYPERTRIGLYCERIDEMIA: ICD-10-CM

## 2024-12-23 DIAGNOSIS — M47.896 OTHER OSTEOARTHRITIS OF SPINE, LUMBAR REGION: ICD-10-CM

## 2024-12-23 DIAGNOSIS — M19.072 ARTHRITIS OF ANKLE, LEFT: ICD-10-CM

## 2024-12-23 DIAGNOSIS — E55.9 VITAMIN D DEFICIENCY: ICD-10-CM

## 2024-12-23 DIAGNOSIS — R26.89 IMBALANCE: ICD-10-CM

## 2024-12-23 DIAGNOSIS — L40.50 PSORIATIC ARTHRITIS (MULTI): ICD-10-CM

## 2024-12-23 PROBLEM — M54.9 CHRONIC BACK PAIN GREATER THAN 3 MONTHS DURATION: Status: RESOLVED | Noted: 2023-05-24 | Resolved: 2024-12-23

## 2024-12-23 PROBLEM — G89.29 CHRONIC BACK PAIN GREATER THAN 3 MONTHS DURATION: Status: RESOLVED | Noted: 2023-05-24 | Resolved: 2024-12-23

## 2024-12-23 PROCEDURE — 3075F SYST BP GE 130 - 139MM HG: CPT | Performed by: FAMILY MEDICINE

## 2024-12-23 PROCEDURE — 99214 OFFICE O/P EST MOD 30 MIN: CPT | Performed by: FAMILY MEDICINE

## 2024-12-23 PROCEDURE — 3079F DIAST BP 80-89 MM HG: CPT | Performed by: FAMILY MEDICINE

## 2024-12-23 PROCEDURE — 1036F TOBACCO NON-USER: CPT | Performed by: FAMILY MEDICINE

## 2024-12-23 RX ORDER — BUSPIRONE HYDROCHLORIDE 15 MG/1
15 TABLET ORAL 2 TIMES DAILY
Qty: 60 TABLET | Refills: 5 | Status: SHIPPED | OUTPATIENT
Start: 2024-12-23 | End: 2025-12-23

## 2024-12-23 RX ORDER — CELECOXIB 200 MG/1
CAPSULE ORAL
Qty: 60 CAPSULE | Refills: 5 | Status: SHIPPED | OUTPATIENT
Start: 2024-12-23

## 2024-12-23 RX ORDER — FLUOXETINE HYDROCHLORIDE 40 MG/1
40 CAPSULE ORAL DAILY
Qty: 30 CAPSULE | Refills: 5 | Status: SHIPPED | OUTPATIENT
Start: 2024-12-23 | End: 2025-12-23

## 2024-12-23 RX ORDER — LISINOPRIL 20 MG/1
20 TABLET ORAL DAILY
Qty: 30 TABLET | Refills: 5 | Status: SHIPPED | OUTPATIENT
Start: 2024-12-23 | End: 2025-12-23

## 2024-12-23 RX ORDER — FENOFIBRATE 160 MG/1
160 TABLET ORAL DAILY
Qty: 30 TABLET | Refills: 5 | Status: SHIPPED | OUTPATIENT
Start: 2024-12-23 | End: 2025-12-23

## 2024-12-23 RX ORDER — CYCLOBENZAPRINE HCL 10 MG
10 TABLET ORAL 2 TIMES DAILY PRN
Qty: 60 TABLET | Refills: 5 | Status: SHIPPED | OUTPATIENT
Start: 2024-12-23 | End: 2025-12-23

## 2024-12-23 ASSESSMENT — ENCOUNTER SYMPTOMS
ABDOMINAL PAIN: 0
PALPITATIONS: 0
FEVER: 0
CONFUSION: 0
CHEST TIGHTNESS: 0
CHILLS: 0
ARTHRALGIAS: 0
SHORTNESS OF BREATH: 0
JOINT SWELLING: 1

## 2024-12-23 NOTE — ASSESSMENT & PLAN NOTE
Patient is following with orthopedics he has been trying special bracing but still has problems with the ankle rolling and pain they have discussed with him additional bracing and special shoes versus surgical intervention at this point he is considering his options but leaning toward surgical intervention.

## 2024-12-23 NOTE — PROGRESS NOTES
Subjective   Patient ID: Chacho William is a 44 y.o. male who presents for Follow-up (4 month ).    HPI patient today for follow-up of ongoing healthcare issues and review of lab work most part doing okay continues to see his various specialist he met with orthopedics given his chronic ankle pain and instability at this point he says they given him option of additional bracing or special shoes versus surgical intervention he still considering his options but leaning more toward surgical intervention.  Also met with neurology with regards to balance issues they have additional testing ordered including MRIs of his cervical and lumbar spine and EMG and lab work.  He continues to follow with his rheumatologist.    Review of Systems   Constitutional:  Negative for chills and fever.   HENT:  Negative for congestion and ear pain.    Eyes:  Negative for visual disturbance.   Respiratory:  Negative for chest tightness and shortness of breath.    Cardiovascular:  Negative for chest pain and palpitations.   Gastrointestinal:  Negative for abdominal pain.   Musculoskeletal:  Positive for gait problem and joint swelling. Negative for arthralgias.   Skin:  Negative for pallor.   Psychiatric/Behavioral:  Negative for confusion.        Objective   /86   Pulse 89   Temp 36.4 °C (97.6 °F)   Resp 14   Wt 142 kg (314 lb)   SpO2 95%   BMI 39.25 kg/m²     Physical Exam  Vitals and nursing note reviewed.   Constitutional:       General: He is not in acute distress.     Appearance: Normal appearance. He is not ill-appearing.   HENT:      Head: Normocephalic and atraumatic.      Right Ear: Tympanic membrane, ear canal and external ear normal.      Left Ear: Tympanic membrane, ear canal and external ear normal.      Mouth/Throat:      Pharynx: Oropharynx is clear.   Eyes:      Extraocular Movements: Extraocular movements intact.   Cardiovascular:      Rate and Rhythm: Normal rate and regular rhythm.      Pulses: Normal pulses.       Heart sounds: Normal heart sounds.   Pulmonary:      Effort: Pulmonary effort is normal.      Breath sounds: Normal breath sounds.   Abdominal:      General: Abdomen is flat. Bowel sounds are normal.      Palpations: Abdomen is soft.      Tenderness: There is no abdominal tenderness.   Musculoskeletal:         General: Tenderness present. Normal range of motion.      Cervical back: Neck supple.      Comments: Tenderness of the ankles bilaterally left greater than right.   Skin:     General: Skin is warm.   Neurological:      Mental Status: He is alert and oriented to person, place, and time. Mental status is at baseline.   Psychiatric:         Mood and Affect: Mood normal.       Recent Results (from the past 6 weeks)   Vitamin B12    Collection Time: 12/10/24  2:48 PM   Result Value Ref Range    Vitamin B12 538 211 - 911 pg/mL   Angiotensin Converting Enzyme    Collection Time: 12/10/24  2:48 PM   Result Value Ref Range    Angio Converting Enzyme <10 (L) 16 - 85 U/L   Creatine Kinase    Collection Time: 12/10/24  2:48 PM   Result Value Ref Range    Creatine Kinase 201 0 - 325 U/L   CBC    Collection Time: 12/20/24  8:04 AM   Result Value Ref Range    WBC 6.3 4.4 - 11.3 x10*3/uL    nRBC 0.0 0.0 - 0.0 /100 WBCs    RBC 5.19 4.50 - 5.90 x10*6/uL    Hemoglobin 15.6 13.5 - 17.5 g/dL    Hematocrit 45.7 41.0 - 52.0 %    MCV 88 80 - 100 fL    MCH 30.1 26.0 - 34.0 pg    MCHC 34.1 32.0 - 36.0 g/dL    RDW 12.8 11.5 - 14.5 %    Platelets 232 150 - 450 x10*3/uL   Comprehensive Metabolic Panel    Collection Time: 12/20/24  8:04 AM   Result Value Ref Range    Glucose 87 74 - 99 mg/dL    Sodium 137 136 - 145 mmol/L    Potassium 4.5 3.5 - 5.3 mmol/L    Chloride 102 98 - 107 mmol/L    Bicarbonate 29 21 - 32 mmol/L    Anion Gap 11 10 - 20 mmol/L    Urea Nitrogen 15 6 - 23 mg/dL    Creatinine 0.82 0.50 - 1.30 mg/dL    eGFR >90 >60 mL/min/1.73m*2    Calcium 9.2 8.6 - 10.3 mg/dL    Albumin 4.3 3.4 - 5.0 g/dL    Alkaline Phosphatase  44 33 - 120 U/L    Total Protein 7.4 6.4 - 8.2 g/dL    AST 20 9 - 39 U/L    Bilirubin, Total 0.4 0.0 - 1.2 mg/dL    ALT 30 10 - 52 U/L   Hemoglobin A1C    Collection Time: 12/20/24  8:04 AM   Result Value Ref Range    Hemoglobin A1C 5.1 See comment %    Estimated Average Glucose 100 Not Established mg/dL   Lipid Panel    Collection Time: 12/20/24  8:04 AM   Result Value Ref Range    Cholesterol 146 0 - 199 mg/dL    HDL-Cholesterol 30.3 mg/dL    Cholesterol/HDL Ratio 4.8     LDL Calculated 65 <=99 mg/dL    VLDL 50 (H) 0 - 40 mg/dL    Triglycerides 252 (H) 0 - 149 mg/dL    Non HDL Cholesterol 116 0 - 149 mg/dL   Vitamin D 25-Hydroxy,Total (for eval of Vitamin D levels)    Collection Time: 12/20/24  8:04 AM   Result Value Ref Range    Vitamin D, 25-Hydroxy, Total 20 (L) 30 - 100 ng/mL     Recent labs reviewed patient should be taking vitamin D3 5000 units daily    Continue fenofibrate and work on dietary modification    He will continue to follow with his various specialist.  Once he reaches a decision with regards to his ankles he will notify the orthopedic physician with regards to scheduling.  He understands that his foot ankle and balance issues may be multifactorial as result surgery alone may not necessarily solve all of his problems.    He refuses immunization    He is to return to our office in 4 months with repeat fasting lab    Assessment/Plan   Problem List Items Addressed This Visit             ICD-10-CM    Arthritis of ankle, left M19.072     Patient is following with orthopedics he has been trying special bracing but still has problems with the ankle rolling and pain they have discussed with him additional bracing and special shoes versus surgical intervention at this point he is considering his options but leaning toward surgical intervention.         Relevant Medications    celecoxib (CeleBREX) 200 mg capsule    Arthritis of ankle, right M19.071     Continue to follow with orthopedics         Relevant  Medications    celecoxib (CeleBREX) 200 mg capsule    Benign essential hypertension - Primary I10     Stable continue current treatment         Relevant Medications    lisinopril 20 mg tablet    Other Relevant Orders    Comprehensive Metabolic Panel    Follow Up In Primary Care - Established    CBC    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     Continue Celebrex Flexeril as needed         Relevant Medications    cyclobenzaprine (Flexeril) 10 mg tablet    Depression, major, single episode, moderate (Multi) F32.1     Continue fluoxetine 40 mg daily         Relevant Medications    FLUoxetine (PROzac) 40 mg capsule    busPIRone (Buspar) 15 mg tablet    Other Relevant Orders    Follow Up In Primary Care - Established    Essential hypertriglyceridemia E78.1     Continue dietary modification along with fenofibrate         Generalized anxiety disorder F41.1     Continue fluoxetine and BuSpar         Hyperlipidemia E78.5     Dietary modification and continue fenofibrate         Relevant Medications    fenofibrate (Triglide) 160 mg tablet    Other Relevant Orders    Comprehensive Metabolic Panel    Lipid Panel    Follow Up In Primary Care - Established    Impaired fasting glucose R73.01     A1c down to 5.1% continue to work on dietary modifications         Relevant Orders    Comprehensive Metabolic Panel    Hemoglobin A1C    Follow Up In Primary Care - Established    CBC    Psoriatic arthritis (Multi) L40.50     Continue to follow with rheumatology along with current medication         Relevant Orders    CBC    Vitamin D deficiency E55.9     Continue to monitor and supplement as needed         Relevant Orders    Vitamin D 25-Hydroxy,Total (for eval of Vitamin D levels)    Thyroid disorder screen Z13.29     TSH with reflex         Relevant Orders    TSH with reflex to Free T4 if abnormal    Imbalance R26.89     Neuro evaluation noted and workup is in progress they did some additional laboratory studies patient  is scheduled for MRI of cervical lumbar spine and they would like to also get an EMG.  Patient understands that his balance issues may be multifactorial.

## 2024-12-23 NOTE — ASSESSMENT & PLAN NOTE
Neuro evaluation noted and workup is in progress they did some additional laboratory studies patient is scheduled for MRI of cervical lumbar spine and they would like to also get an EMG.  Patient understands that his balance issues may be multifactorial.

## 2024-12-31 ENCOUNTER — HOSPITAL ENCOUNTER (OUTPATIENT)
Dept: RADIOLOGY | Facility: HOSPITAL | Age: 44
Discharge: HOME | End: 2024-12-31
Payer: COMMERCIAL

## 2024-12-31 DIAGNOSIS — M54.12 CERVICAL RADICULOPATHY: ICD-10-CM

## 2024-12-31 DIAGNOSIS — M54.16 LUMBAR RADICULOPATHY: ICD-10-CM

## 2024-12-31 DIAGNOSIS — R53.1 WEAKNESS: ICD-10-CM

## 2024-12-31 DIAGNOSIS — R26.89 IMBALANCE: ICD-10-CM

## 2024-12-31 DIAGNOSIS — R20.2 PARESTHESIAS: ICD-10-CM

## 2024-12-31 PROCEDURE — 72141 MRI NECK SPINE W/O DYE: CPT

## 2024-12-31 PROCEDURE — 72148 MRI LUMBAR SPINE W/O DYE: CPT

## 2024-12-31 PROCEDURE — 72141 MRI NECK SPINE W/O DYE: CPT | Performed by: RADIOLOGY

## 2024-12-31 PROCEDURE — 72148 MRI LUMBAR SPINE W/O DYE: CPT | Performed by: RADIOLOGY

## 2025-01-08 ENCOUNTER — PREP FOR PROCEDURE (OUTPATIENT)
Dept: ORTHOPEDIC SURGERY | Facility: HOSPITAL | Age: 45
End: 2025-01-08

## 2025-01-08 ENCOUNTER — OFFICE VISIT (OUTPATIENT)
Dept: ORTHOPEDIC SURGERY | Facility: HOSPITAL | Age: 45
End: 2025-01-08
Payer: COMMERCIAL

## 2025-01-08 VITALS — HEIGHT: 75 IN | WEIGHT: 314 LBS | BODY MASS INDEX: 39.04 KG/M2

## 2025-01-08 DIAGNOSIS — M20.12 HALLUX VALGUS OF LEFT FOOT: ICD-10-CM

## 2025-01-08 DIAGNOSIS — M05.772 RHEUMATOID ARTHRITIS INVOLVING BOTH FEET WITH POSITIVE RHEUMATOID FACTOR (MULTI): Primary | ICD-10-CM

## 2025-01-08 DIAGNOSIS — M05.772: ICD-10-CM

## 2025-01-08 DIAGNOSIS — M05.771 RHEUMATOID ARTHRITIS INVOLVING BOTH FEET WITH POSITIVE RHEUMATOID FACTOR (MULTI): Primary | ICD-10-CM

## 2025-01-08 PROCEDURE — 3008F BODY MASS INDEX DOCD: CPT | Performed by: SPECIALIST

## 2025-01-08 PROCEDURE — 1036F TOBACCO NON-USER: CPT | Performed by: SPECIALIST

## 2025-01-08 PROCEDURE — 99214 OFFICE O/P EST MOD 30 MIN: CPT | Performed by: SPECIALIST

## 2025-01-08 NOTE — PROGRESS NOTES
Follow Up RA based degenerative hallux valgus and varus ankle arthritis     Patient here today to discuss Surgery.  He sees rheumatology for medical management and also has appropriate custom braces but persist with left ankle and first MTP pain.    Exam: Alert and oriented x 3.  Still has significant cavovarus of the left foot but no erythema or ecchymosis, diffuse swelling.  Pain to palpation over the anterolateral ankle.  Left hallux valgus with pain to palpation manipulation of the first MTPJ.  Rest exams unremarkable with intact dermis and normal neurovascular status.  Has evidence of some Raynaud's however.     Radiographs 3 views of the left foot show a degenerative hallux valgus.  AP standing ankle shows varus talar tilt with erosions around the ankle consistent with RA.    Assessment plan: Left varus ankle arthritis/instability.  Left severe degenerative hallux valgus.  He has failed bracing and medication management of his pain and instability and would like to proceed with surgical correction.  This would be a first MTP arthrodesis and ankle arthrodesis.  We discussed in great detail the nature of surgery risks and benefits, and a presurgical informed consent was obtained today.  He would need to come off anti-inflammatories/immune suppressive medication at least 1 week prior to surgery.

## 2025-01-17 ENCOUNTER — APPOINTMENT (OUTPATIENT)
Dept: RHEUMATOLOGY | Facility: CLINIC | Age: 45
End: 2025-01-17
Payer: COMMERCIAL

## 2025-01-17 VITALS
HEART RATE: 80 BPM | DIASTOLIC BLOOD PRESSURE: 80 MMHG | HEIGHT: 75 IN | BODY MASS INDEX: 39.17 KG/M2 | SYSTOLIC BLOOD PRESSURE: 117 MMHG | WEIGHT: 315 LBS

## 2025-01-17 DIAGNOSIS — I73.00 RAYNAUD'S PHENOMENON WITHOUT GANGRENE: ICD-10-CM

## 2025-01-17 DIAGNOSIS — M35.9 CONNECTIVE TISSUE DISEASE (MULTI): Primary | ICD-10-CM

## 2025-01-17 PROCEDURE — 3079F DIAST BP 80-89 MM HG: CPT | Performed by: INTERNAL MEDICINE

## 2025-01-17 PROCEDURE — 3074F SYST BP LT 130 MM HG: CPT | Performed by: INTERNAL MEDICINE

## 2025-01-17 PROCEDURE — 99213 OFFICE O/P EST LOW 20 MIN: CPT | Performed by: INTERNAL MEDICINE

## 2025-01-17 PROCEDURE — 3008F BODY MASS INDEX DOCD: CPT | Performed by: INTERNAL MEDICINE

## 2025-01-17 RX ORDER — NIFEDIPINE 30 MG/1
30 TABLET, FILM COATED, EXTENDED RELEASE ORAL
Qty: 90 TABLET | Refills: 1 | Status: SHIPPED | OUTPATIENT
Start: 2025-01-17 | End: 2025-04-17

## 2025-01-17 RX ORDER — HYDROXYCHLOROQUINE SULFATE 200 MG/1
200 TABLET, FILM COATED ORAL 2 TIMES DAILY
Qty: 180 TABLET | Refills: 0 | Status: SHIPPED | OUTPATIENT
Start: 2025-01-17 | End: 2025-04-17

## 2025-01-17 NOTE — PROGRESS NOTES
"Subjective . Chacho William is a 44 y.o. male who presents for Follow-up (6 month follow up).    HPI. 44-year-old male with history of CTD, Raynaud's phenomena, chronic low back pain with radiculopathy, gout, psoriasis, anxiety, depression, obesity and EMANI presented for follow-up.     Still feels weakness in lower extremities.  MRI of the C-spine showed C5-C6 and C6 the C7 foraminal stenosis.  MRI of the lumbar spine showed increased multilevel degenerative changes and hemilaminectomy at L4-L5 and L5-S1 with severe foraminal stenosis.  He is following with neurology.    Left foot x-ray showed advanced left ankle osteoarthritis and bunion with hallux valgus.  He is going for foot surgery next month.    He states Raynaud's is not too bad.    Review of Systems   All other systems reviewed and are negative.    Objective     Blood pressure 117/80, pulse 80, height 1.905 m (6' 3\"), weight 144 kg (317 lb).    Physical Exam.  Gen. AAO x3, NAD.  HEENT: No pallor or icterus, PERRLA, EOMI. Parotid glands  not enlarged. No cervical lymphadenopathy .  Skin: No rashes.  Heart: S1, S2/ RRR.   Lungs: CTA B.  Abdomen: Soft, NT/ND.  MSK: No.swelling or tenderness of the hands, wrist, elbows knees and MTP joints.  Neck, spine and SI joint without tenderness.  Neuro:  Sensation to touch intact.Strength 5/5 throughout.   Psych:Appropriate mood and behavior  EXT: No edema    Assessment/Plan .      #1: CTD/Raynaud's.  Stable.  -Continue hydroxychloroquine twice a day.  Eye exam up to date.  -Continue quetiapine once a day.    -He is going for bunionectomy and left ankle fusion surgery next month.  He can continue hydroxychloroquine.      Follow-up in 4 months.     This note was partially generated using the Dragon Voice recognition system. There may be some incorrect wording, spelling and/or spelling errors or punctuation errors that were not corrected prior to committing the note to the medical record.        Problem List Items " Addressed This Visit       Connective tissue disease (Multi)    Relevant Medications    hydroxychloroquine (Plaquenil) 200 mg tablet    Raynaud's phenomenon without gangrene    Relevant Medications    NIFEdipine ER (Adalat CC) 30 mg 24 hr tablet            Active Ambulatory Problems     Diagnosis Date Noted    Acquired bilateral pes cavus 05/24/2023    KATI positive 05/24/2023    Arthritis of ankle, left 05/24/2023    Arthritis of ankle, right 05/24/2023    Asymptomatic bunion 05/24/2023    Benign essential hypertension 05/24/2023    Connective tissue disease (Multi) 05/24/2023    Lumbar radiculopathy 05/24/2023    Degenerative joint disease (DJD) of lumbar spine 05/24/2023    Depression, major, single episode, moderate (Multi) 05/24/2023    Essential hypertriglyceridemia 05/24/2023    Generalized anxiety disorder 05/24/2023    Gout 05/24/2023    Hyperlipidemia 05/24/2023    Impaired fasting glucose 05/24/2023    Obstructive sleep apnea 05/24/2023    Peripheral vascular disorder (CMS-HCC) 05/24/2023    Psoriasis 05/24/2023    Psoriatic arthritis (Multi) 05/24/2023    Raynaud's phenomenon without gangrene 05/24/2023    Right foot pain 05/24/2023    Vitamin D deficiency 05/24/2023    Thyroid disorder screen 12/18/2023    Severe obesity (BMI 35.0-39.9) with comorbidity (Multi) 12/18/2023    Hyperkalemia 08/19/2024    Weakness 08/19/2024    Paresthesias 12/09/2024    Cervical radiculopathy 12/09/2024    Imbalance 12/09/2024    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 (Multi) 01/08/2025     Resolved Ambulatory Problems     Diagnosis Date Noted    Ankle pain 05/24/2023    Class 2 severe obesity with body mass index (BMI) of 35 to 39.9 with serious comorbidity 05/24/2023    Chronic back pain greater than 3 months duration 05/24/2023    Morbid obesity (Multi) 05/24/2023    Diverticulitis of colon 05/24/2023    Low back pain with left-sided sciatica 05/24/2023     Right peroneal tendinosis 05/24/2023    Bilateral impacted cerumen 07/26/2023    Arm numbness left 10/07/2023    Sepsis due to methicillin susceptible Staphylococcus aureus (Multi) 10/02/2019    Other infective bursitis, right knee 10/02/2019    Upper respiratory tract infection 11/14/2023    Bilateral impacted cerumen 04/15/2024     Past Medical History:   Diagnosis Date    Personal history of other diseases of the circulatory system     Personal history of other mental and behavioral disorders     Personal history of other specified conditions        Family History   Problem Relation Name Age of Onset    Other (htn) Mother      Breast cancer Mother      Lung cancer Father         Past Surgical History:   Procedure Laterality Date    OTHER SURGICAL HISTORY  10/15/2019    Tonsillectomy    OTHER SURGICAL HISTORY  10/15/2019    Back surgery    OTHER SURGICAL HISTORY  10/15/2019    Laminectomy    OTHER SURGICAL HISTORY  10/18/2019    Knee surgery    OTHER SURGICAL HISTORY  10/18/2019    Foot fracture repair       Social History     Tobacco Use   Smoking Status Never    Passive exposure: Past   Smokeless Tobacco Never       Allergies  Nsaids (non-steroidal anti-inflammatory drug)    Current Meds  Current Outpatient Medications   Medication Instructions    busPIRone (BUSPAR) 15 mg, oral, 2 times daily    celecoxib (CeleBREX) 200 mg capsule TAKE ONE CAPSULE BY MOUTH TWICE A DAY WITH MEALS    cyclobenzaprine (FLEXERIL) 10 mg, oral, 2 times daily PRN    fenofibrate (TRIGLIDE) 160 mg, oral, Daily    FLUoxetine (PROZAC) 40 mg, oral, Daily    hydroxychloroquine (PLAQUENIL) 200 mg, oral, 2 times daily    lisinopril 20 mg, oral, Daily    NIFEdipine ER (ADALAT CC) 30 mg, oral, Daily before breakfast, Do not crush, chew, or split.        Lab Results   Component Value Date    ANATITER 1:2560 07/18/2022    C3 141 05/15/2024    C4 40 05/15/2024    RF <10 11/07/2024    SEDRATE 14 09/16/2024    CRP 0.10 09/16/2024    URICACID 7.9 (H)  05/15/2024    DNADS <1.0 05/15/2024    CKTOTAL 201 12/10/2024             Jay Martines MD

## 2025-01-20 DIAGNOSIS — Z01.818 PRE-OP EVALUATION: Primary | ICD-10-CM

## 2025-01-20 DIAGNOSIS — E78.1 ESSENTIAL HYPERTRIGLYCERIDEMIA: ICD-10-CM

## 2025-01-20 DIAGNOSIS — I10 BENIGN ESSENTIAL HYPERTENSION: ICD-10-CM

## 2025-01-20 DIAGNOSIS — G47.33 OBSTRUCTIVE SLEEP APNEA: ICD-10-CM

## 2025-01-23 ENCOUNTER — HOSPITAL ENCOUNTER (OUTPATIENT)
Dept: RADIOLOGY | Facility: CLINIC | Age: 45
Discharge: HOME | End: 2025-01-23
Payer: COMMERCIAL

## 2025-01-23 ENCOUNTER — LAB (OUTPATIENT)
Dept: LAB | Facility: LAB | Age: 45
End: 2025-01-23
Payer: COMMERCIAL

## 2025-01-23 ENCOUNTER — APPOINTMENT (OUTPATIENT)
Dept: PRIMARY CARE | Facility: CLINIC | Age: 45
End: 2025-01-23
Payer: COMMERCIAL

## 2025-01-23 VITALS
BODY MASS INDEX: 39.75 KG/M2 | TEMPERATURE: 97.2 F | SYSTOLIC BLOOD PRESSURE: 123 MMHG | RESPIRATION RATE: 15 BRPM | HEART RATE: 79 BPM | DIASTOLIC BLOOD PRESSURE: 86 MMHG | WEIGHT: 315 LBS | OXYGEN SATURATION: 96 %

## 2025-01-23 DIAGNOSIS — Z01.818 PREOP EXAMINATION: Primary | ICD-10-CM

## 2025-01-23 DIAGNOSIS — M19.072 ARTHRITIS OF ANKLE, LEFT: ICD-10-CM

## 2025-01-23 DIAGNOSIS — Z01.818 PREOP EXAMINATION: ICD-10-CM

## 2025-01-23 DIAGNOSIS — I10 BENIGN ESSENTIAL HYPERTENSION: ICD-10-CM

## 2025-01-23 LAB
ERYTHROCYTE [DISTWIDTH] IN BLOOD BY AUTOMATED COUNT: 12.8 % (ref 11.5–14.5)
HCT VFR BLD AUTO: 47.9 % (ref 41–52)
HGB BLD-MCNC: 15.9 G/DL (ref 13.5–17.5)
MCH RBC QN AUTO: 29.6 PG (ref 26–34)
MCHC RBC AUTO-ENTMCNC: 33.2 G/DL (ref 32–36)
MCV RBC AUTO: 89 FL (ref 80–100)
NRBC BLD-RTO: 0 /100 WBCS (ref 0–0)
PLATELET # BLD AUTO: 259 X10*3/UL (ref 150–450)
RBC # BLD AUTO: 5.38 X10*6/UL (ref 4.5–5.9)
WBC # BLD AUTO: 10.2 X10*3/UL (ref 4.4–11.3)

## 2025-01-23 PROCEDURE — 71046 X-RAY EXAM CHEST 2 VIEWS: CPT

## 2025-01-23 PROCEDURE — 80048 BASIC METABOLIC PNL TOTAL CA: CPT

## 2025-01-23 PROCEDURE — 99213 OFFICE O/P EST LOW 20 MIN: CPT | Performed by: FAMILY MEDICINE

## 2025-01-23 PROCEDURE — 93000 ELECTROCARDIOGRAM COMPLETE: CPT | Performed by: FAMILY MEDICINE

## 2025-01-23 PROCEDURE — 3074F SYST BP LT 130 MM HG: CPT | Performed by: FAMILY MEDICINE

## 2025-01-23 PROCEDURE — 36415 COLL VENOUS BLD VENIPUNCTURE: CPT

## 2025-01-23 PROCEDURE — 3079F DIAST BP 80-89 MM HG: CPT | Performed by: FAMILY MEDICINE

## 2025-01-23 PROCEDURE — 1036F TOBACCO NON-USER: CPT | Performed by: FAMILY MEDICINE

## 2025-01-23 PROCEDURE — 85027 COMPLETE CBC AUTOMATED: CPT

## 2025-01-23 ASSESSMENT — ENCOUNTER SYMPTOMS
CHEST TIGHTNESS: 0
ARTHRALGIAS: 0
CHILLS: 0
ABDOMINAL PAIN: 0
SHORTNESS OF BREATH: 0
CONFUSION: 0
PALPITATIONS: 0
FEVER: 0

## 2025-01-23 NOTE — PROGRESS NOTES
Subjective   Patient ID: Chacho William is a 44 y.o. male who presents for Pre-op Exam.    HPI patient today for preoperative examination.  He says he is scheduled for February 17, 2025 for left ankle fusion and left foot bunionectomy.  He says he will need about 12 to 15 weeks of recovery postop.  He has already received the necessary equipment with regards to scooter walker wheelchair if needed.  He has a supportive family which will help him with day-to-day activities.    Review of Systems   Constitutional:  Negative for chills and fever.   HENT:  Negative for congestion and ear pain.    Eyes:  Negative for visual disturbance.   Respiratory:  Negative for chest tightness and shortness of breath.    Cardiovascular:  Negative for chest pain and palpitations.   Gastrointestinal:  Negative for abdominal pain.   Musculoskeletal:  Negative for arthralgias.        Left ankle and foot pain   Skin:  Negative for pallor.   Psychiatric/Behavioral:  Negative for confusion.        Objective   /86   Pulse 79   Temp 36.2 °C (97.2 °F)   Resp 15   Wt 144 kg (318 lb)   SpO2 96%   BMI 39.75 kg/m²     Physical Exam  Vitals and nursing note reviewed.   Constitutional:       General: He is not in acute distress.     Appearance: Normal appearance. He is not ill-appearing.   HENT:      Head: Normocephalic and atraumatic.      Right Ear: Tympanic membrane, ear canal and external ear normal.      Left Ear: Tympanic membrane, ear canal and external ear normal.      Mouth/Throat:      Pharynx: Oropharynx is clear.   Eyes:      Extraocular Movements: Extraocular movements intact.   Cardiovascular:      Rate and Rhythm: Normal rate and regular rhythm.      Pulses: Normal pulses.      Heart sounds: Normal heart sounds.   Pulmonary:      Effort: Pulmonary effort is normal.      Breath sounds: Normal breath sounds.   Abdominal:      General: Abdomen is flat. Bowel sounds are normal.      Palpations: Abdomen is soft.      Tenderness:  There is no abdominal tenderness.   Musculoskeletal:         General: Tenderness present. No swelling.      Cervical back: Neck supple.      Comments: Left ankle pain on palpation.  Left foot bunion.   Skin:     General: Skin is warm.   Neurological:      Mental Status: He is alert and oriented to person, place, and time. Mental status is at baseline.   Psychiatric:         Mood and Affect: Mood normal.     EKG  Chest x-ray  CBC and a BMP    Patient is medically cleared for upcoming procedure pending test results    He is to hold Celebrex 1 week prior to the procedure.        Follow-up appointment end of April 2025.    Assessment/Plan   Problem List Items Addressed This Visit             ICD-10-CM    Arthritis of ankle, left M19.072     Patient for left ankle fusion he has failed conservative measures with regards to medication and bracing.         Benign essential hypertension I10     Continue current medication BP stable         Relevant Orders    ECG 12 lead (Clinic Performed) (Completed)    Preop examination - Primary Z01.818     EKG reviewed.    Chest x-ray  CBC and a BMP    Patient to be medically cleared pending additional test results.    Patient states surgery is scheduled for [facility]/10/20[facility] at [facility].         Relevant Orders    CBC    Basic Metabolic Panel    XR chest 2 views    ECG 12 lead (Clinic Performed) (Completed)

## 2025-01-23 NOTE — ASSESSMENT & PLAN NOTE
Patient for left ankle fusion he has failed conservative measures with regards to medication and bracing.

## 2025-01-23 NOTE — ASSESSMENT & PLAN NOTE
EKG reviewed.    Chest x-ray  CBC and a BMP    Patient to be medically cleared pending additional test results.    Patient states surgery is scheduled [facility]10/[facility].

## 2025-01-23 NOTE — H&P (VIEW-ONLY)
Subjective   Patient ID: Chacho William is a 44 y.o. male who presents for Pre-op Exam.    HPI patient today for preoperative examination.  He says he is scheduled for February 17, 2025 for left ankle fusion and left foot bunionectomy.  He says he will need about 12 to 15 weeks of recovery postop.  He has already received the necessary equipment with regards to scooter walker wheelchair if needed.  He has a supportive family which will help him with day-to-day activities.    Review of Systems   Constitutional:  Negative for chills and fever.   HENT:  Negative for congestion and ear pain.    Eyes:  Negative for visual disturbance.   Respiratory:  Negative for chest tightness and shortness of breath.    Cardiovascular:  Negative for chest pain and palpitations.   Gastrointestinal:  Negative for abdominal pain.   Musculoskeletal:  Negative for arthralgias.        Left ankle and foot pain   Skin:  Negative for pallor.   Psychiatric/Behavioral:  Negative for confusion.        Objective   /86   Pulse 79   Temp 36.2 °C (97.2 °F)   Resp 15   Wt 144 kg (318 lb)   SpO2 96%   BMI 39.75 kg/m²     Physical Exam  Vitals and nursing note reviewed.   Constitutional:       General: He is not in acute distress.     Appearance: Normal appearance. He is not ill-appearing.   HENT:      Head: Normocephalic and atraumatic.      Right Ear: Tympanic membrane, ear canal and external ear normal.      Left Ear: Tympanic membrane, ear canal and external ear normal.      Mouth/Throat:      Pharynx: Oropharynx is clear.   Eyes:      Extraocular Movements: Extraocular movements intact.   Cardiovascular:      Rate and Rhythm: Normal rate and regular rhythm.      Pulses: Normal pulses.      Heart sounds: Normal heart sounds.   Pulmonary:      Effort: Pulmonary effort is normal.      Breath sounds: Normal breath sounds.   Abdominal:      General: Abdomen is flat. Bowel sounds are normal.      Palpations: Abdomen is soft.      Tenderness:  There is no abdominal tenderness.   Musculoskeletal:         General: Tenderness present. No swelling.      Cervical back: Neck supple.      Comments: Left ankle pain on palpation.  Left foot bunion.   Skin:     General: Skin is warm.   Neurological:      Mental Status: He is alert and oriented to person, place, and time. Mental status is at baseline.   Psychiatric:         Mood and Affect: Mood normal.     EKG  Chest x-ray  CBC and a BMP    Patient is medically cleared for upcoming procedure pending test results    He is to hold Celebrex 1 week prior to the procedure.        Follow-up appointment end of April 2025.    1/26/2025 addendum  Preoperative CBC BMP and chest x-ray all unremarkable for any acute issues.  Patient medically cleared for upcoming orthopedic procedure.    Assessment/Plan   Problem List Items Addressed This Visit             ICD-10-CM    Arthritis of ankle, left M19.072     Patient for left ankle fusion he has failed conservative measures with regards to medication and bracing.         Benign essential hypertension I10     Continue current medication BP stable         Relevant Orders    ECG 12 lead (Clinic Performed) (Completed)    Preop examination - Primary Z01.818     EKG reviewed.    Chest x-ray  CBC and a BMP    Patient to be medically cleared pending additional test results.    Patient states surgery is scheduled for 2/10/2025 at [facility].         Relevant Orders    CBC    Basic Metabolic Panel    XR chest 2 views    ECG 12 lead (Clinic Performed) (Completed)

## 2025-01-24 LAB
ANION GAP SERPL CALC-SCNC: 13 MMOL/L (ref 10–20)
BUN SERPL-MCNC: 15 MG/DL (ref 6–23)
CALCIUM SERPL-MCNC: 10.3 MG/DL (ref 8.6–10.6)
CHLORIDE SERPL-SCNC: 100 MMOL/L (ref 98–107)
CO2 SERPL-SCNC: 30 MMOL/L (ref 21–32)
CREAT SERPL-MCNC: 0.9 MG/DL (ref 0.5–1.3)
EGFRCR SERPLBLD CKD-EPI 2021: >90 ML/MIN/1.73M*2
GLUCOSE SERPL-MCNC: 92 MG/DL (ref 74–99)
POTASSIUM SERPL-SCNC: 4.7 MMOL/L (ref 3.5–5.3)
SODIUM SERPL-SCNC: 138 MMOL/L (ref 136–145)

## 2025-02-11 ENCOUNTER — ANESTHESIA EVENT (OUTPATIENT)
Dept: OPERATING ROOM | Facility: HOSPITAL | Age: 45
End: 2025-02-11
Payer: COMMERCIAL

## 2025-02-11 SDOH — HEALTH STABILITY: MENTAL HEALTH: CURRENT SMOKER: 0

## 2025-02-11 NOTE — ANESTHESIA PREPROCEDURE EVALUATION
Patient: Chacho William    Procedure Information       Date/Time: 02/17/25 0705    Procedures:       LEFT ANKLE FUSION, LEFT 1ST METATARSAOPHALANGEAL JOINT FUSION (gen/block) *C ARM* (SYNTHES: CROSS-ROADS PLATE, 7.3 CANNULATED SCREWS, SAW, LINH, HINTERMANS) (Left: Ankle) - GENERAL/BLOCK, 120 MINUTES, STANDARD C-ARM, SYNTHES: CROSS-ROADS PLATE, 7.3 CANNULATED SCREWS, SAW, LINH, HINTERMANS      . (Left: Foot) - GENERAL/BLOCK, 120 MINUTES, STANDARD C-ARM, SYNTHES: CROSS-ROADS PLATE, 7.3 CANNULATED SCREWS, SAW, LINH, HINTERMANS    Location: [facility] OR 07 / Virtual [facility] OR    Surgeons: Reji Champion MD            Relevant Problems   Anesthesia (within normal limits)      Cardiac   (+) Benign essential hypertension   (+) Essential hypertriglyceridemia   (+) Hyperlipidemia   (+) Peripheral vascular disorder (CMS-HCC)      Neuro   (+) Cervical radiculopathy   (+) Depression, major, single episode, moderate (Multi)   (+) Generalized anxiety disorder   (+) Lumbar radiculopathy      Endocrine   (+) Severe obesity (BMI 35.0-39.9) with comorbidity (Multi)      Musculoskeletal   (+) Degenerative joint disease (DJD) of lumbar spine   (+) 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 Patient has known history of LLE neuropathy, numbness, weakness from known back injury. Risks benefits discussed. Patient wishes to proceed with nerve blocks.)  The patient is not a current  smoker.    intravenous induction   Postoperative administration of opioids is intended.  Anesthetic plan and risks discussed with patient.  Use of blood products discussed with patient who.    Plan discussed with CRNA.

## 2025-02-17 ENCOUNTER — APPOINTMENT (OUTPATIENT)
Dept: RADIOLOGY | Facility: HOSPITAL | Age: 45
End: 2025-02-17
Payer: COMMERCIAL

## 2025-02-17 ENCOUNTER — PHARMACY VISIT (OUTPATIENT)
Dept: PHARMACY | Facility: CLINIC | Age: 45
End: 2025-02-17
Payer: COMMERCIAL

## 2025-02-17 ENCOUNTER — HOSPITAL ENCOUNTER (OUTPATIENT)
Facility: HOSPITAL | Age: 45
Setting detail: OUTPATIENT SURGERY
Discharge: HOME | End: 2025-02-17
Attending: SPECIALIST | Admitting: SPECIALIST
Payer: COMMERCIAL

## 2025-02-17 ENCOUNTER — ANESTHESIA (OUTPATIENT)
Dept: OPERATING ROOM | Facility: HOSPITAL | Age: 45
End: 2025-02-17
Payer: COMMERCIAL

## 2025-02-17 VITALS
WEIGHT: 315 LBS | BODY MASS INDEX: 39.17 KG/M2 | HEART RATE: 79 BPM | TEMPERATURE: 97.2 F | RESPIRATION RATE: 12 BRPM | HEIGHT: 75 IN | DIASTOLIC BLOOD PRESSURE: 80 MMHG | OXYGEN SATURATION: 99 % | SYSTOLIC BLOOD PRESSURE: 145 MMHG

## 2025-02-17 DIAGNOSIS — M05.772: Primary | ICD-10-CM

## 2025-02-17 PROCEDURE — 2720000007 HC OR 272 NO HCPCS: Performed by: SPECIALIST

## 2025-02-17 PROCEDURE — 2500000004 HC RX 250 GENERAL PHARMACY W/ HCPCS (ALT 636 FOR OP/ED): Performed by: SPECIALIST

## 2025-02-17 PROCEDURE — 3700000002 HC GENERAL ANESTHESIA TIME - EACH INCREMENTAL 1 MINUTE: Performed by: SPECIALIST

## 2025-02-17 PROCEDURE — C1713 ANCHOR/SCREW BN/BN,TIS/BN: HCPCS | Performed by: SPECIALIST

## 2025-02-17 PROCEDURE — 28750 FUSION OF BIG TOE JOINT: CPT | Performed by: SPECIALIST

## 2025-02-17 PROCEDURE — 2500000004 HC RX 250 GENERAL PHARMACY W/ HCPCS (ALT 636 FOR OP/ED)

## 2025-02-17 PROCEDURE — 76000 FLUOROSCOPY <1 HR PHYS/QHP: CPT

## 2025-02-17 PROCEDURE — 3600000004 HC OR TIME - INITIAL BASE CHARGE - PROCEDURE LEVEL FOUR: Performed by: SPECIALIST

## 2025-02-17 PROCEDURE — 7100000010 HC PHASE TWO TIME - EACH INCREMENTAL 1 MINUTE: Performed by: SPECIALIST

## 2025-02-17 PROCEDURE — 7100000002 HC RECOVERY ROOM TIME - EACH INCREMENTAL 1 MINUTE: Performed by: SPECIALIST

## 2025-02-17 PROCEDURE — RXMED WILLOW AMBULATORY MEDICATION CHARGE

## 2025-02-17 PROCEDURE — 2780000003 HC OR 278 NO HCPCS: Performed by: SPECIALIST

## 2025-02-17 PROCEDURE — 2500000004 HC RX 250 GENERAL PHARMACY W/ HCPCS (ALT 636 FOR OP/ED): Performed by: NURSE ANESTHETIST, CERTIFIED REGISTERED

## 2025-02-17 PROCEDURE — A4649 SURGICAL SUPPLIES: HCPCS | Performed by: SPECIALIST

## 2025-02-17 PROCEDURE — 7100000009 HC PHASE TWO TIME - INITIAL BASE CHARGE: Performed by: SPECIALIST

## 2025-02-17 PROCEDURE — 27870 FUSION OF ANKLE JOINT OPEN: CPT | Performed by: SPECIALIST

## 2025-02-17 PROCEDURE — 3600000009 HC OR TIME - EACH INCREMENTAL 1 MINUTE - PROCEDURE LEVEL FOUR: Performed by: SPECIALIST

## 2025-02-17 PROCEDURE — 2500000004 HC RX 250 GENERAL PHARMACY W/ HCPCS (ALT 636 FOR OP/ED): Performed by: ANESTHESIOLOGY

## 2025-02-17 PROCEDURE — 7100000001 HC RECOVERY ROOM TIME - INITIAL BASE CHARGE: Performed by: SPECIALIST

## 2025-02-17 PROCEDURE — 2500000005 HC RX 250 GENERAL PHARMACY W/O HCPCS

## 2025-02-17 PROCEDURE — 27870 FUSION OF ANKLE JOINT OPEN: CPT

## 2025-02-17 PROCEDURE — 3700000001 HC GENERAL ANESTHESIA TIME - INITIAL BASE CHARGE: Performed by: SPECIALIST

## 2025-02-17 PROCEDURE — 28750 FUSION OF BIG TOE JOINT: CPT

## 2025-02-17 DEVICE — THE MOTOBAND™ CP IMPLANT SYSTEM IS A BONE PLATING SYSTEM INDICATED FOR STABILIZATION AND FIXATION OF FRESH FRACTURES, REVISION PROCEDURES, JOINT FUSION AND RECONSTRUCTION OF SMALL BONES OF THE HAND, FEET, WRIST, ANKLES, FINGERS AND TOES.  IT CONSIST OF PLATES AND SCREWS.
Type: IMPLANTABLE DEVICE | Site: ANKLE | Status: FUNCTIONAL
Brand: MOTOBAND

## 2025-02-17 DEVICE — 4CC SYRINGE OF BG PUTTY BIOACTIVE BONE GRAFT SUBSTITUTE
Type: IMPLANTABLE DEVICE | Site: ANKLE | Status: FUNCTIONAL
Brand: FIBERGRAFT BG PUTTY

## 2025-02-17 RX ORDER — DROPERIDOL 2.5 MG/ML
0.62 INJECTION, SOLUTION INTRAMUSCULAR; INTRAVENOUS ONCE AS NEEDED
Status: DISCONTINUED | OUTPATIENT
Start: 2025-02-17 | End: 2025-02-17 | Stop reason: HOSPADM

## 2025-02-17 RX ORDER — ONDANSETRON HYDROCHLORIDE 2 MG/ML
4 INJECTION, SOLUTION INTRAVENOUS ONCE AS NEEDED
Status: DISCONTINUED | OUTPATIENT
Start: 2025-02-17 | End: 2025-02-17 | Stop reason: HOSPADM

## 2025-02-17 RX ORDER — LABETALOL HYDROCHLORIDE 5 MG/ML
5 INJECTION, SOLUTION INTRAVENOUS ONCE AS NEEDED
Status: DISCONTINUED | OUTPATIENT
Start: 2025-02-17 | End: 2025-02-17 | Stop reason: HOSPADM

## 2025-02-17 RX ORDER — HYDROMORPHONE HYDROCHLORIDE 1 MG/ML
INJECTION, SOLUTION INTRAMUSCULAR; INTRAVENOUS; SUBCUTANEOUS AS NEEDED
Status: DISCONTINUED | OUTPATIENT
Start: 2025-02-17 | End: 2025-02-17

## 2025-02-17 RX ORDER — DEXMEDETOMIDINE IN 0.9 % NACL 20 MCG/5ML
SYRINGE (ML) INTRAVENOUS AS NEEDED
Status: DISCONTINUED | OUTPATIENT
Start: 2025-02-17 | End: 2025-02-17

## 2025-02-17 RX ORDER — OXYCODONE AND ACETAMINOPHEN 5; 325 MG/1; MG/1
1 TABLET ORAL EVERY 4 HOURS PRN
Status: DISCONTINUED | OUTPATIENT
Start: 2025-02-17 | End: 2025-02-17 | Stop reason: HOSPADM

## 2025-02-17 RX ORDER — FAMOTIDINE 10 MG/ML
20 INJECTION, SOLUTION INTRAVENOUS ONCE
Status: COMPLETED | OUTPATIENT
Start: 2025-02-17 | End: 2025-02-17

## 2025-02-17 RX ORDER — ONDANSETRON HYDROCHLORIDE 2 MG/ML
INJECTION, SOLUTION INTRAVENOUS AS NEEDED
Status: DISCONTINUED | OUTPATIENT
Start: 2025-02-17 | End: 2025-02-17

## 2025-02-17 RX ORDER — SODIUM CHLORIDE, SODIUM LACTATE, POTASSIUM CHLORIDE, CALCIUM CHLORIDE 600; 310; 30; 20 MG/100ML; MG/100ML; MG/100ML; MG/100ML
100 INJECTION, SOLUTION INTRAVENOUS CONTINUOUS
Status: DISCONTINUED | OUTPATIENT
Start: 2025-02-17 | End: 2025-02-17 | Stop reason: HOSPADM

## 2025-02-17 RX ORDER — LIDOCAINE HYDROCHLORIDE AND EPINEPHRINE 10; 10 UG/ML; MG/ML
INJECTION, SOLUTION INFILTRATION; PERINEURAL AS NEEDED
Status: DISCONTINUED | OUTPATIENT
Start: 2025-02-17 | End: 2025-02-17

## 2025-02-17 RX ORDER — ESMOLOL HYDROCHLORIDE 10 MG/ML
INJECTION INTRAVENOUS AS NEEDED
Status: DISCONTINUED | OUTPATIENT
Start: 2025-02-17 | End: 2025-02-17

## 2025-02-17 RX ORDER — MIDAZOLAM HYDROCHLORIDE 1 MG/ML
INJECTION, SOLUTION INTRAMUSCULAR; INTRAVENOUS AS NEEDED
Status: DISCONTINUED | OUTPATIENT
Start: 2025-02-17 | End: 2025-02-17

## 2025-02-17 RX ORDER — BUPIVACAINE HYDROCHLORIDE 2.5 MG/ML
INJECTION, SOLUTION EPIDURAL; INFILTRATION; INTRACAUDAL AS NEEDED
Status: DISCONTINUED | OUTPATIENT
Start: 2025-02-17 | End: 2025-02-17

## 2025-02-17 RX ORDER — ROPIVACAINE HCL/PF 100MG/20ML
SYRINGE (ML) INJECTION AS NEEDED
Status: DISCONTINUED | OUTPATIENT
Start: 2025-02-17 | End: 2025-02-17

## 2025-02-17 RX ORDER — CEFAZOLIN SODIUM 1 G/50ML
SOLUTION INTRAVENOUS AS NEEDED
Status: DISCONTINUED | OUTPATIENT
Start: 2025-02-17 | End: 2025-02-17

## 2025-02-17 RX ORDER — TRANEXAMIC ACID 100 MG/ML
INJECTION, SOLUTION INTRAVENOUS AS NEEDED
Status: DISCONTINUED | OUTPATIENT
Start: 2025-02-17 | End: 2025-02-17

## 2025-02-17 RX ORDER — OXYCODONE AND ACETAMINOPHEN 5; 325 MG/1; MG/1
2 TABLET ORAL EVERY 6 HOURS PRN
Qty: 30 TABLET | Refills: 0 | Status: SHIPPED | OUTPATIENT
Start: 2025-02-17

## 2025-02-17 RX ORDER — HYDRALAZINE HYDROCHLORIDE 20 MG/ML
5 INJECTION INTRAMUSCULAR; INTRAVENOUS EVERY 30 MIN PRN
Status: DISCONTINUED | OUTPATIENT
Start: 2025-02-17 | End: 2025-02-17 | Stop reason: HOSPADM

## 2025-02-17 RX ORDER — LIDOCAINE HCL/PF 100 MG/5ML
SYRINGE (ML) INTRAVENOUS AS NEEDED
Status: DISCONTINUED | OUTPATIENT
Start: 2025-02-17 | End: 2025-02-17

## 2025-02-17 RX ORDER — DIPHENHYDRAMINE HYDROCHLORIDE 50 MG/ML
12.5 INJECTION INTRAMUSCULAR; INTRAVENOUS ONCE AS NEEDED
Status: DISCONTINUED | OUTPATIENT
Start: 2025-02-17 | End: 2025-02-17 | Stop reason: HOSPADM

## 2025-02-17 RX ORDER — MORPHINE SULFATE 2 MG/ML
2 INJECTION, SOLUTION INTRAMUSCULAR; INTRAVENOUS EVERY 5 MIN PRN
Status: DISCONTINUED | OUTPATIENT
Start: 2025-02-17 | End: 2025-02-17 | Stop reason: HOSPADM

## 2025-02-17 RX ORDER — ALBUTEROL SULFATE 0.83 MG/ML
2.5 SOLUTION RESPIRATORY (INHALATION) ONCE AS NEEDED
Status: DISCONTINUED | OUTPATIENT
Start: 2025-02-17 | End: 2025-02-17 | Stop reason: HOSPADM

## 2025-02-17 RX ORDER — CEFAZOLIN SODIUM 2 G/100ML
2 INJECTION, SOLUTION INTRAVENOUS ONCE
Status: COMPLETED | OUTPATIENT
Start: 2025-02-17 | End: 2025-02-17

## 2025-02-17 RX ORDER — LIDOCAINE HYDROCHLORIDE 10 MG/ML
0.1 INJECTION, SOLUTION EPIDURAL; INFILTRATION; INTRACAUDAL; PERINEURAL ONCE
Status: DISCONTINUED | OUTPATIENT
Start: 2025-02-17 | End: 2025-02-17 | Stop reason: HOSPADM

## 2025-02-17 RX ORDER — PROPOFOL 10 MG/ML
INJECTION, EMULSION INTRAVENOUS AS NEEDED
Status: DISCONTINUED | OUTPATIENT
Start: 2025-02-17 | End: 2025-02-17

## 2025-02-17 RX ORDER — FENTANYL CITRATE 50 UG/ML
INJECTION, SOLUTION INTRAMUSCULAR; INTRAVENOUS AS NEEDED
Status: DISCONTINUED | OUTPATIENT
Start: 2025-02-17 | End: 2025-02-17

## 2025-02-17 RX ADMIN — DEXAMETHASONE SODIUM PHOSPHATE 8 MG: 4 INJECTION, SOLUTION INTRAMUSCULAR; INTRAVENOUS at 07:43

## 2025-02-17 RX ADMIN — HYDROMORPHONE HYDROCHLORIDE 0.1 MG: 1 INJECTION INTRAMUSCULAR; INTRAVENOUS; SUBCUTANEOUS at 07:58

## 2025-02-17 RX ADMIN — FENTANYL CITRATE 50 MCG: 50 INJECTION INTRAMUSCULAR; INTRAVENOUS at 09:32

## 2025-02-17 RX ADMIN — Medication 4 MCG: at 09:03

## 2025-02-17 RX ADMIN — FENTANYL CITRATE 25 MCG: 50 INJECTION INTRAMUSCULAR; INTRAVENOUS at 09:01

## 2025-02-17 RX ADMIN — Medication 4 MCG: at 08:40

## 2025-02-17 RX ADMIN — HYDROMORPHONE HYDROCHLORIDE 0.4 MG: 1 INJECTION INTRAMUSCULAR; INTRAVENOUS; SUBCUTANEOUS at 07:54

## 2025-02-17 RX ADMIN — CEFAZOLIN SODIUM 1 G: 1 INJECTION, SOLUTION INTRAVENOUS at 07:41

## 2025-02-17 RX ADMIN — PROPOFOL 50 MG: 10 INJECTION, EMULSION INTRAVENOUS at 07:54

## 2025-02-17 RX ADMIN — FENTANYL CITRATE 25 MCG: 50 INJECTION INTRAMUSCULAR; INTRAVENOUS at 09:07

## 2025-02-17 RX ADMIN — FENTANYL CITRATE 50 MCG: 50 INJECTION INTRAMUSCULAR; INTRAVENOUS at 08:51

## 2025-02-17 RX ADMIN — Medication 8 MCG: at 07:47

## 2025-02-17 RX ADMIN — SODIUM CHLORIDE, POTASSIUM CHLORIDE, SODIUM LACTATE AND CALCIUM CHLORIDE: 600; 310; 30; 20 INJECTION, SOLUTION INTRAVENOUS at 07:27

## 2025-02-17 RX ADMIN — CEFAZOLIN SODIUM 2 G: 2 INJECTION, SOLUTION INTRAVENOUS at 07:36

## 2025-02-17 RX ADMIN — ONDANSETRON 4 MG: 2 INJECTION INTRAMUSCULAR; INTRAVENOUS at 07:49

## 2025-02-17 RX ADMIN — HYDROMORPHONE HYDROCHLORIDE 0.3 MG: 1 INJECTION INTRAMUSCULAR; INTRAVENOUS; SUBCUTANEOUS at 08:47

## 2025-02-17 RX ADMIN — TRANEXAMIC ACID 500 MG: 100 INJECTION, SOLUTION INTRAVENOUS at 09:21

## 2025-02-17 RX ADMIN — ESMOLOL HYDROCHLORIDE 20 MG: 10 INJECTION, SOLUTION INTRAVENOUS at 09:46

## 2025-02-17 RX ADMIN — LIDOCAINE HYDROCHLORIDE,EPINEPHRINE BITARTRATE 5 ML: 10; .01 INJECTION, SOLUTION INFILTRATION; PERINEURAL at 07:28

## 2025-02-17 RX ADMIN — FENTANYL CITRATE 50 MCG: 50 INJECTION INTRAMUSCULAR; INTRAVENOUS at 09:20

## 2025-02-17 RX ADMIN — MIDAZOLAM 2 MG: 1 INJECTION INTRAMUSCULAR; INTRAVENOUS at 07:08

## 2025-02-17 RX ADMIN — Medication 8 MCG: at 08:03

## 2025-02-17 RX ADMIN — FAMOTIDINE 20 MG: 10 INJECTION, SOLUTION INTRAVENOUS at 06:28

## 2025-02-17 RX ADMIN — HYDROMORPHONE HYDROCHLORIDE 0.2 MG: 1 INJECTION INTRAMUSCULAR; INTRAVENOUS; SUBCUTANEOUS at 08:28

## 2025-02-17 RX ADMIN — FENTANYL CITRATE 100 MCG: 50 INJECTION INTRAMUSCULAR; INTRAVENOUS at 07:08

## 2025-02-17 RX ADMIN — MIDAZOLAM 2 MG: 1 INJECTION INTRAMUSCULAR; INTRAVENOUS at 07:19

## 2025-02-17 RX ADMIN — LIDOCAINE HYDROCHLORIDE 100 MG: 20 INJECTION INTRAVENOUS at 07:37

## 2025-02-17 RX ADMIN — Medication 15 ML: at 07:42

## 2025-02-17 RX ADMIN — BUPIVACAINE HYDROCHLORIDE 20 ML: 2.5 INJECTION, SOLUTION EPIDURAL; INFILTRATION; INTRACAUDAL; PERINEURAL at 07:28

## 2025-02-17 RX ADMIN — PROPOFOL 50 MCG/KG/MIN: 10 INJECTION, EMULSION INTRAVENOUS at 08:14

## 2025-02-17 RX ADMIN — PROPOFOL 200 MG: 10 INJECTION, EMULSION INTRAVENOUS at 07:40

## 2025-02-17 RX ADMIN — TRANEXAMIC ACID 1000 MG: 100 INJECTION, SOLUTION INTRAVENOUS at 09:57

## 2025-02-17 ASSESSMENT — PAIN SCALES - GENERAL
PAINLEVEL_OUTOF10: 8
PAIN_LEVEL: 0
PAINLEVEL_OUTOF10: 0 - NO PAIN

## 2025-02-17 ASSESSMENT — PAIN - FUNCTIONAL ASSESSMENT
PAIN_FUNCTIONAL_ASSESSMENT: 0-10

## 2025-02-17 NOTE — ANESTHESIA POSTPROCEDURE EVALUATION
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    Anesthesia Post Evaluation    Patient location during evaluation: PACU  Patient participation: complete - patient participated  Level of consciousness: awake  Pain score: 0  Pain management: adequate  Airway patency: patent  Cardiovascular status: acceptable  Respiratory status: acceptable  Hydration status: acceptable  Postoperative Nausea and Vomiting: none    No notable events documented.

## 2025-02-17 NOTE — ANESTHESIA PROCEDURE NOTES
Peripheral Block    Patient location during procedure: pre-op  Start time: 2/17/2025 7:14 AM  End time: 2/17/2025 7:28 AM  Reason for block: primary anesthetic, procedure for pain, at surgeon's request and post-op pain management  Staffing  Performed: SRNA and CRNA   Authorized by: ANNE Welch    Performed by: ANNE Heck  Preanesthetic Checklist  Completed: patient identified, IV checked, site marked, risks and benefits discussed, surgical consent, monitors and equipment checked, pre-op evaluation and timeout performed   Timeout performed at: 2/17/2025 7:05 AM  Peripheral Block  Patient position: prone.  Prep: ChloraPrep  Patient monitoring: heart rate, cardiac monitor and continuous pulse ox  Block type: popliteal  Laterality: left  Injection technique: single-shot  Guidance: nerve stimulator  Local infiltration: bupivicaine  Infiltration strength: 0.3 %  Dose: 20 mL  Needle  Needle gauge: 20 G  Needle localization: ultrasound guidance and nerve stimulator  Test dose: negative  Assessment  Injection assessment: negative aspiration for heme, no paresthesia on injection, incremental injection and local visualized surrounding nerve on ultrasound  Paresthesia pain: none  Heart rate change: no  Slow fractionated injection: yes  Additional Notes  Anesthesia consult was placed by Dr. Champion for post procedural analgesia. The patients chart was reviewed and they were deemed an appropriate candidate for the procedure. The patient was educated in detail on the risks, benefits, and alternatives to the block including but not limited to: temporary or permanent nerve damage, bleeding, and infection, damage to surrounding tissues, possible block failure, and the potential for local anesthesia toxicity syndrome. The patient expressed understanding and all questions were answered prior to initiation of the procedure. Informed consent was also signed by the patient and laterality determined per  "institutional policy. A formal \"time out\" consistent with the institutions rules and regulations were performed by the anesthesia provider and appropriate RN.    Procedure  The patient was placed in the PRONE position. The LEFT side was marked and skin prep applied and allowed to dry. Proper monitors were applied with oxygen. Sedation was provided by administering:    Versed 4 mg IV in divided doses.  Fentanyl 100 mcg IV  Attempt x1 by Alan LIMA under direct observation of A Bulcroft CRNA. Attempt x1 by A Mernaofdonnie CRNA.  A high frequency linear ultrasound probe with probe cover was placed over the popliteal fossa and sciatic nerve with popliteal vascular structures identified using sterile coupling gel following institutional skin prep.  The popliteal vein was easily collapsible, and popliteal artery found to be grossly normal under color Doppler flow prior to the procedure. An 20g 4 inch stimuplex needle was then advanced maintaining an in-plane visualization throughout the procedure, under ultrasound guidance from lateral to medial, to come to rest just deep to the sciatic neurovascular sheath at the level of the bifurcation, but avoiding contact of the common peroneal nerve. A positive motor response was visualized from the nerve stimulator. A loss of response was seen at 0.50 mAmp. Upon negative aspiration, 5ml 1% Lidocaine with epinephrine 1:100,000, 20ml 0.25% Bupivacaine with 4mg decadron preservative free was administered safely and cautiously around the nerve structure. Aspiration every 3-5 ml was done to avoid potential intravascular injection.  All injections were done without resistance and were free of blood.  The patient tolerated the procedure well without report of intense pain, tinnitus, metallic taste, or circumoral numbness. Patient reported high anxiety. The block was then evaluated after a few minutes and appeared to be functioning appropriately.                "

## 2025-02-17 NOTE — ANESTHESIA PROCEDURE NOTES
Peripheral Block    Patient location during procedure: OR  Start time: 2/17/2025 7:39 AM  End time: 2/17/2025 7:42 AM  Reason for block: procedure for pain, at surgeon's request and post-op pain management  Staffing  Performed: CRNA   Authorized by: ANNE Welch    Performed by: ANNE Heck  Preanesthetic Checklist  Completed: patient identified, IV checked, site marked, risks and benefits discussed, surgical consent, monitors and equipment checked, pre-op evaluation and timeout performed   Timeout performed at: 2/17/2025 7:38 AM  Peripheral Block  Patient position: laying flat  Prep: ChloraPrep  Patient monitoring: heart rate, cardiac monitor and continuous pulse ox  Block type: saphenous  Laterality: left  Injection technique: single-shot  Local infiltration: ropivacaine  Infiltration strength: 0.5 %  Dose: 15 mL  Needle  Needle gauge: 25g.  Needle localization: anatomical landmarks  Assessment  Injection assessment: negative aspiration for heme and incremental injection  Paresthesia pain: none  Heart rate change: no  Slow fractionated injection: yes  Additional Notes  Block requested by surgeon. Risks benefits discussed. Patient agreeable to saphenous nerve block. Monitors on. Site marked. Time out complete. Patient induced under general anesthesia. Sterile prep and drape. Landmarks ID 25g needle used attempt x1 . 15 ml  0.5% ropivacaine with 4mg decadron preservative free administered in divided doses for subpatellar nerve block. Aspiration every 3 ml. Negative heme. No complications noted.

## 2025-02-17 NOTE — ANESTHESIA PROCEDURE NOTES
Airway  Date/Time: 2/17/2025 7:43 AM  Urgency: elective    Airway not difficult    Staffing  Performed: JANIE   Authorized by: AUSTIN Welch-CRNA    Performed by: Alan Cruz  Patient location during procedure: OR    Indications and Patient Condition  Indications for airway management: anesthesia  Spontaneous Ventilation: absent  Sedation level: deep  Preoxygenated: yes  Patient position: sniffing  MILS not maintained throughout  Mask difficulty assessment: 0 - not attempted  No planned trial extubation    Final Airway Details  Final airway type: supraglottic airway      Successful airway: Supraglottic airway: I gel.  Size 5     Number of attempts at approach: 1  Ventilation between attempts: none  Number of other approaches attempted: 0    Additional Comments  Dentition and lips intact post intubation

## 2025-02-17 NOTE — OP NOTE
LEFT ANKLE FUSION, LEFT 1ST METATARSAOPHALANGEAL JOINT FUSION (gen/block) *C ARM* (SYNTHES: CROSS-ROADS PLATE, 7.3 CANNULATED SCREWS, SAW, LINH, HINTERMANS) (L) Operative Note     Date: 2025  OR Location: [facility]    Name: Chacho William, : 1980, Age: 44 y.o., MRN: [id number], Sex: male    Diagnosis  Pre-op Diagnosis      * 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 (Multi) [M05.772] Post-op Diagnosis     * 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 (Multi) [M05.772]     Procedures  LEFT ANKLE FUSION, LEFT 1ST METATARSAOPHALANGEAL JOINT FUSION (gen/block) *C ARM* (SYNTHES: CROSS-ROADS PLATE, 7.3 CANNULATED SCREWS, SAW, LINH, HINTERMANS)  30859 - [place] ARTHRODESIS ANKLE OPEN      Surgeons      * Reji Champion - Primary    Resident/Fellow/Other Assistant:  Surgeons and Role:     * Elvia Umana PA-C - [facility] First Assist    Staff:   Circulator: Hortencia  Scrub Person: Danyelle  Surgical Assistant: Bircan  Relief Circulator: Nelly  Scrub Person: Layla    Anesthesia Staff: CRNA: AUSTIN Welch-SOUTH  SRNA: Alan Cruz    Procedure Summary  Anesthesia: Regional, General  ASA: III  Estimated Blood Loss: 20 mL  Intra-op Medications:   Administrations occurring from 0705 to 0948 on 25:   Medication Name Total Dose   bupivacaine PF (Marcaine) injection 0.25 % 20 mL   ceFAZolin (Ancef) IV 1 g in 50 mL dextrose (iso) - premix 1 g   dexAMETHasone (Decadron) injection 4 mg/mL 8 mg   dexAMETHasone (Decadron) injection 4 mg/mL 8 mg   dexmedeTOMidine 4 mcg/mL in NS 5 mL syringe 24 mcg   esmolol (Brevibloc) injection 20 mg   fentaNYL (Sublimaze) injection 50 mcg/mL 300 mcg   HYDROmorphone (Dilaudid) injection 1 mg/mL 1 mg   LR bolus Cannot be calculated   lidocaine (cardiac) injection 2% prefilled syringe 100 mg   lidocaine 1%-EPINEPHrine 1:100,000 (Xylocaine W/EPI) injection 5 mL   midazolam  "(Versed) injection 1 mg/mL 4 mg   ondansetron (Zofran) 2 mg/mL injection 4 mg   propofol (Diprivan) injection 10 mg/mL 921.63 mg   ropivacaine PF (Naropin) 0.5% - Syringe 15 mL   tranexamic acid injection 100 mg/mL 500 mg   ceFAZolin (Ancef) 2 g in dextrose (iso)  mL 2 g              Anesthesia Record               Intraprocedure I/O Totals          Intake    Tranexamic Acid 0.00 mL    The total shown is the total volume documented since Anesthesia Start was filed.    ceFAZolin (Ancef) 2 g in dextrose (iso)  mL 100.00 mL    Total Intake 100 mL          Specimen: No specimens collected              Drains and/or Catheters: * None in log *    Tourniquet Times:     Total Tourniquet Time Documented:  Thigh (Left) - 120 minutes  Total: Thigh (Left) - 120 minutes      Implants:  Implants       Type Name Action Serial No.       FIBERGRAFT [id number] Implanted       7.5 ST CANNULATED SCREW 70 Implanted       7.5 ST CANNULATED SCREW 55 Implanted       7.5 ST CANNULATED SCREW 60 Implanted       7.5 ST CANNULATED SCREW 45 Implanted       DYNAFORCE MTP PLATE LONG, 16MM CLIP 5\" DORSIFLEXION, LEFT Implanted       POLYAXIAL LOCKING SCREW [id number] Implanted      Screw SCREW, LOCKING, POLYAXIAL, MOTOBAND, 3.5 X 18MM - [id number] Implanted      Screw SCREW, LOCKING, POLYAXIAL, MOTOBAND, 3.5 X 18MM - [id number] Implanted       HIMAX [id number] Implanted       GENERAL STAPLE PLATE SHARPS KIT Implanted       [id number] Implanted               Findings: Severe rheumatoid arthritis and deformity ankle and first MTPJ    Indications: Chacho William is an 44 y.o. male who is having surgery for 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 (Multi) [M05.772].  A preoperative informed consent was obtained from the patient    The patient was seen in the preoperative area. The risks, benefits, complications, treatment options, non-operative alternatives, expected " recovery and outcomes were discussed with the patient. The possibilities of reaction to medication, pulmonary aspiration, injury to surrounding structures, bleeding, recurrent infection, the need for additional procedures, failure to diagnose a condition, and creating a complication requiring transfusion or operation were discussed with the patient. The patient concurred with the proposed plan, giving informed consent.  The site of surgery was properly noted/marked if necessary per policy. The patient has been actively warmed in preoperative area. Preoperative antibiotics have been ordered and given within 1 hours of incision. Venous thrombosis prophylaxis have been ordered including unilateral sequential compression device    Procedure Details: Patient was brought to the OR after successful popliteal block on the left lower extremity.  We started with team huddle and a timeout to identify the correct procedure patient and side.  Patient then underwent a general anesthetic and given IV antibiotic and then the left lower extremity was sterilely prepped and draped below the thigh tourniquet.  We began by exsanguinating left lower extremity Esmarch elevating the tourniquet to 300 mmHg.  We did use C arm guidance throughout the case.  Using 11 blade we made a 12 cm longitudinal incision over the lateral malleolus of the ankle.  After sharp dissection of the dermis careful blunt dissection brought down to the periosteum of the lateral malleolus.  Fresh 15 blade then skeletonized out the lateral malleolus just keeping inferior and posterior ligaments intact and not disturbing the peroneal tendons.  We then used a sagittal saw to do an oblique osteotomy cut of the distal fibula about 3 cm above the ankle.  We then rotated the lateral malleolus externally to expose the joint.  We are able to pin temporarily in the lateral malleolus out of position to keep the exposure.  Then with a [facility] retractor in the anterior  capsule of the ankle we took down the capsule from the distal tibia to fully expose the joint.  We placed a intermittent pin distractor across the ankle from anterolateral distracted the joint to identify the arthritic joint cartilage with complete visualization.  We then used a combination of osteotomes curettes and power bur to prepare matching concave convex surfaces of the distal tibia and talar dome for fusion of the joint.  We had to get down to subchondral bone throughout and then used a small osteotome to do a fish scaling technique to expose subchondral vessels.  We used the Synthes 7 3 cannulated screw guidepins to pin the joint in a neutral fusion position neutral dorsiflexion 5 to 10 degrees external rotation in neutral varus valgus.  These pins were placed first parallel from the lateral process of the talus into the distal medial tibia 1 antegrade 1 retrograde.  We elected to place the Synthes glass fiber bone graft filler into the joint and then placed our 2 parallel compression 7 3 cannulated screws in usual technique under C arm guidance.  We elected to place a third screw in an antirotation position from the distal lateral tibia into the base of the talar neck.  With these 3 screws compressed across the bone graft we noted excellent alignment stability of the joint.  We gently irrigated this lateral ankle incision and then closed in layers the fascial layer with a running 0 Vicryl suture.  The subdermis with interrupted buried 2-0 Vicryl and the skin with vertical mattress 3-0 nylon.  We then turned our attention to the great toe.  A dorsal longitudinal incision was made over the first MTPJ about 6 cm in length.  After sharp dissection the dermis blunt dissection brought us down to the medial retinaculum and EHL.  This retinaculum was cut along the medial border the EHL EHL retracted laterally to expose the first MTP dorsal joint capsule.  A fresh 15 blade incised the capsule and we subperiosteally  took it off the base of the proximal phalanx and the head of the first metatarsal.  With the joint exposed we plantarflexed it to place a guidepin for the first MTP reamers.  We did a concave distally and a convex proximally Synthes reamer to take down degenerative cartilage and subchondral bone to make matching fusion surfaces.  We used a sterile box top to represent the floor and placed the toe in a neutral fusion position with temporary fixation with guidepins.  We took down excess bone over the medial bunion region and osteophytes over the dorsal and lateral aspects.  We then used a [employer] staple compression plate with total of 435 locking screws and 118 mm compression staple maintaining the fusion site in neutral.  We copiously irrigated that incision then closed in layers the capsule with a running 2-0 Vicryl skin with subdermal 2-0 Vicryl and 4 oh vertical nylon mattress nylon.  Patient then had sterile dressings applied along with well-padded posterior fiberglass splint.  He was then awoken in stable condition transferred recovery.  Should be noted we did let the tourniquet down at 2 hours and controlled bleeding with electrocautery.  After the splint was placed the patient awoke in stable condition transferred to recovery.  Patient awoke in stable condition minimal blood loss no complications.    Complications:  None; patient tolerated the procedure well.    Disposition: PACU - hemodynamically stable.  Condition: stable         Task Performed by RNFA or Surgical Assistant:  Elvia Umana PA-C was present for the entire case. Their assistance was necessary and critical to the successful completion of the procedure.They provided skilled assistance with leg positioning, retraction, leg manipulation, implant insertion and wound closure.  A qualified assistant was not available to perform their portion of the case.             Additional Details: None    Attending Attestation: I was present and scrubbed for  the entire procedure.    Reji Champion  Phone Number: [phone]

## 2025-02-18 ASSESSMENT — PAIN SCALES - GENERAL: PAINLEVEL_OUTOF10: 5 - MODERATE PAIN

## 2025-02-25 ENCOUNTER — TELEPHONE (OUTPATIENT)
Dept: ORTHOPEDIC SURGERY | Facility: HOSPITAL | Age: 45
End: 2025-02-25
Payer: COMMERCIAL

## 2025-02-25 DIAGNOSIS — M05.772: ICD-10-CM

## 2025-02-25 RX ORDER — OXYCODONE AND ACETAMINOPHEN 5; 325 MG/1; MG/1
2 TABLET ORAL EVERY 6 HOURS PRN
Qty: 20 TABLET | Refills: 0 | Status: SHIPPED | OUTPATIENT
Start: 2025-02-25

## 2025-02-25 NOTE — TELEPHONE ENCOUNTER
LEFT ANKLE FUSION, LEFT 1ST METATARSAOPHALANGEAL JOINT FUSION 2/17/2025 - Asking for a refill on his pain medication  - Percocet- to [facility] [place]

## 2025-03-04 ENCOUNTER — OFFICE VISIT (OUTPATIENT)
Dept: ORTHOPEDIC SURGERY | Facility: HOSPITAL | Age: 45
End: 2025-03-04
Payer: COMMERCIAL

## 2025-03-04 VITALS — WEIGHT: 315 LBS | BODY MASS INDEX: 39.17 KG/M2 | HEIGHT: 75 IN

## 2025-03-04 DIAGNOSIS — M05.772 RHEUMATOID ARTHRITIS INVOLVING BOTH FEET WITH POSITIVE RHEUMATOID FACTOR (MULTI): Primary | ICD-10-CM

## 2025-03-04 DIAGNOSIS — M05.771 RHEUMATOID ARTHRITIS INVOLVING BOTH FEET WITH POSITIVE RHEUMATOID FACTOR (MULTI): Primary | ICD-10-CM

## 2025-03-04 DIAGNOSIS — Z98.1 STATUS POST ANKLE FUSION: ICD-10-CM

## 2025-03-04 DIAGNOSIS — M20.12 HALLUX VALGUS OF LEFT FOOT: ICD-10-CM

## 2025-03-04 DIAGNOSIS — M05.772: ICD-10-CM

## 2025-03-04 PROCEDURE — 1036F TOBACCO NON-USER: CPT

## 2025-03-04 PROCEDURE — 3008F BODY MASS INDEX DOCD: CPT

## 2025-03-04 PROCEDURE — L4361 PNEUMA/VAC WALK BOOT PRE OTS: HCPCS

## 2025-03-04 PROCEDURE — 99211 OFF/OP EST MAY X REQ PHY/QHP: CPT

## 2025-03-04 RX ORDER — OXYCODONE AND ACETAMINOPHEN 5; 325 MG/1; MG/1
2 TABLET ORAL EVERY 6 HOURS PRN
Qty: 15 TABLET | Refills: 0 | Status: SHIPPED | OUTPATIENT
Start: 2025-03-04

## 2025-03-04 ASSESSMENT — PAIN - FUNCTIONAL ASSESSMENT: PAIN_FUNCTIONAL_ASSESSMENT: 0-10

## 2025-03-04 ASSESSMENT — PAIN SCALES - GENERAL: PAINLEVEL_OUTOF10: 5 - MODERATE PAIN

## 2025-03-04 NOTE — PROGRESS NOTES
POST-OPERATIVE FOLLOW UP      ============================  IMPRESSION/PLAN:  ============================  44 y.o. male s/p left ankle fusion completed on 2/17/2025    PLAN:  -Sutures and splint removed, Steri-Strips applied.  Patient was placed in a high tide boot at this time.  -Weightbearing: Non-weight bearing  -DVT Prophylaxis: Aspirin 325 mg twice daily for 4 weeks postoperatively  -Radiology Studies: None taken today  -Therapies: will hold off on beginning therapy/rehab at this time.  We will reassess at his next follow-up appointment  -Transition off assistive device as seen fit by patient and therapy  -Follow-up: In 2 weeks with Dr. Champion for updated nonweightbearing x-rays and reevaluation        Chacho WOODSON William presents today for follow up of the above operation. Pain controlled with current treatment plan.  He did run out of his Percocet and would like a refill for pain control at nighttime.  Patient has not begun physical therapy.  They are currently nonweightbearing.    Review of Systems:   Constitutional: See HPI for pain assessment, No significant weight loss, recent trauma. Denies fevers/chills  Cardiovascular: No chest pain, shortness of breath  Respiratory: No difficulty breathing, cough  Gastrointestinal: No nausea, vomiting, diarrhea, constipation  Musculoskeletal: Noted in HPI, no arthralgias   Integumentary: No rashes, easy bruising, redness   Neurological: no numbness or tingling in extremities, no gait disturbances     Patient Active Problem List   Diagnosis    Acquired bilateral pes cavus    KATI positive    Arthritis of ankle, left    Arthritis of ankle, right    Asymptomatic bunion    Benign essential hypertension    Connective tissue disease (Multi)    Lumbar radiculopathy    Degenerative joint disease (DJD) of lumbar spine    Depression, major, single episode, moderate (Multi)    Essential hypertriglyceridemia    Generalized anxiety disorder    Gout    Hyperlipidemia    Impaired  fasting glucose    Obstructive sleep apnea    Peripheral vascular disorder (CMS-HCC)    Psoriasis    Psoriatic arthritis (Multi)    Raynaud's phenomenon without gangrene    Right foot pain    Vitamin D deficiency    Thyroid disorder screen    Severe obesity (BMI 35.0-39.9) with comorbidity (Multi)    Hyperkalemia    Weakness    Paresthesias    Cervical radiculopathy    Imbalance    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 (Multi)    Preop examination       =================================  EXAM  =================================  Constitutional   General appearance: Alert and in no acute distress. Well developed, well nourished.    Eyes   External Eye, Conjunctiva and lids: Normal external exam - extraocular movements intact (EOMI).   Ears, Nose, Mouth, and Throat   Hearing: Normal.   Neck   Neck: No neck mass was observed. Supple.   Pulmonary   Respiratory effort: No respiratory distress.   Cardiovascular   Examination of extremities: No peripheral edema.   Psychiatric   Judgment and insight: Intact.   Orientation to person, place, and time: Alert and oriented x 3.   Mood and affect: Normal.   Musculoskeletal:   Steri strips intact. Incisions are well-healing with no signs of wound dehiscence or infection.  No drainage or discharge from the incision and no overlying erythema.  Mild edema and warmth present.  Sensation intact distally to light touch.  Capillary refill less than 2 seconds, dorsalis pedis pulse 2+.      Elvia Umana PA-C  Physician Assistant  Orthopedic Surgery  [facility]

## 2025-03-11 DIAGNOSIS — Z98.1 STATUS POST ANKLE FUSION: ICD-10-CM

## 2025-03-12 ENCOUNTER — TELEPHONE (OUTPATIENT)
Dept: ORTHOPEDIC SURGERY | Facility: CLINIC | Age: 45
End: 2025-03-12
Payer: COMMERCIAL

## 2025-03-12 NOTE — TELEPHONE ENCOUNTER
LEFT ANKLE FUSION, LEFT 1ST METATARSAOPHALANGEAL JOINT FUSION 2/17/2025 - Asking for a refill on his pain medication  - Percocet- to [facility] [place]     Also would like to know if his foot should still be swollen when not elevated?    Please advise

## 2025-03-13 ENCOUNTER — TELEPHONE (OUTPATIENT)
Dept: ORTHOPEDIC SURGERY | Facility: HOSPITAL | Age: 45
End: 2025-03-13
Payer: COMMERCIAL

## 2025-03-13 DIAGNOSIS — M05.772: ICD-10-CM

## 2025-03-13 RX ORDER — OXYCODONE AND ACETAMINOPHEN 5; 325 MG/1; MG/1
2 TABLET ORAL EVERY 6 HOURS PRN
Qty: 20 TABLET | Refills: 0 | Status: SHIPPED | OUTPATIENT
Start: 2025-03-13

## 2025-03-13 NOTE — TELEPHONE ENCOUNTER
Patient called in stating his dog ate the straps off his boot and asking what he could do I did inform this patient that this boot is an important factor is his healing process being that he is non weight bearing at this time, I did instruct him that we could give him another boot but I could not assure him that insurance would cover this. He stated that it was just the top strap and he would secure it with a belt and remain non weight bearing until being seen 3//18/25. I did also inform patient that he could possibly look online for a cheaper walking boot to secure the ankle.

## 2025-03-18 ENCOUNTER — HOSPITAL ENCOUNTER (OUTPATIENT)
Dept: RADIOLOGY | Facility: HOSPITAL | Age: 45
Discharge: HOME | End: 2025-03-18
Payer: COMMERCIAL

## 2025-03-18 ENCOUNTER — OFFICE VISIT (OUTPATIENT)
Dept: ORTHOPEDIC SURGERY | Facility: HOSPITAL | Age: 45
End: 2025-03-18
Payer: COMMERCIAL

## 2025-03-18 VITALS — WEIGHT: 315 LBS | HEIGHT: 75 IN | BODY MASS INDEX: 39.17 KG/M2

## 2025-03-18 DIAGNOSIS — Z98.1 STATUS POST ANKLE FUSION: Primary | ICD-10-CM

## 2025-03-18 DIAGNOSIS — Z98.1 STATUS POST ANKLE FUSION: ICD-10-CM

## 2025-03-18 PROCEDURE — 1036F TOBACCO NON-USER: CPT | Performed by: SPECIALIST

## 2025-03-18 PROCEDURE — 3008F BODY MASS INDEX DOCD: CPT | Performed by: SPECIALIST

## 2025-03-18 PROCEDURE — 99211 OFF/OP EST MAY X REQ PHY/QHP: CPT | Performed by: SPECIALIST

## 2025-03-18 PROCEDURE — 73610 X-RAY EXAM OF ANKLE: CPT | Mod: LT

## 2025-03-18 NOTE — PROGRESS NOTES
1 month s/p left ankle fusion completed on 2/17/2025   XR Done Today   Patient doing well no concerns.  No fevers chills or other constitutional symptoms.  Exam:  Left ankle with well-healed incisions just small amount of serous drainage on the lateral.  Good alignment of the first ray and ankle.  Dermis is intact neurovascular intact negative Homans.    Radiographs 3 views nonweightbearing the ankle show excellent alignment and stable fixation.    Assessment plan: Healing ankle and first MTP arthrodesis.  Will see him back in a month.  At least 2 more weeks nonweightbearing and then a progression to full.  In a month we will do three-view standing foot and AP standing ankle films.

## 2025-03-30 ENCOUNTER — APPOINTMENT (OUTPATIENT)
Dept: RADIOLOGY | Facility: HOSPITAL | Age: 45
End: 2025-03-30
Payer: COMMERCIAL

## 2025-03-30 ENCOUNTER — HOSPITAL ENCOUNTER (EMERGENCY)
Facility: HOSPITAL | Age: 45
Discharge: HOME | End: 2025-03-30
Attending: EMERGENCY MEDICINE
Payer: COMMERCIAL

## 2025-03-30 VITALS
SYSTOLIC BLOOD PRESSURE: 135 MMHG | RESPIRATION RATE: 20 BRPM | HEIGHT: 74 IN | WEIGHT: 310 LBS | OXYGEN SATURATION: 95 % | DIASTOLIC BLOOD PRESSURE: 95 MMHG | HEART RATE: 93 BPM | BODY MASS INDEX: 39.78 KG/M2 | TEMPERATURE: 97.8 F

## 2025-03-30 DIAGNOSIS — L03.116 CELLULITIS OF LEFT LOWER EXTREMITY: Primary | ICD-10-CM

## 2025-03-30 LAB
ANION GAP SERPL CALC-SCNC: 12 MMOL/L (ref 10–20)
BASOPHILS # BLD AUTO: 0.03 X10*3/UL (ref 0–0.1)
BASOPHILS NFR BLD AUTO: 0.4 %
BUN SERPL-MCNC: 13 MG/DL (ref 6–23)
CALCIUM SERPL-MCNC: 9.3 MG/DL (ref 8.6–10.3)
CHLORIDE SERPL-SCNC: 107 MMOL/L (ref 98–107)
CO2 SERPL-SCNC: 23 MMOL/L (ref 21–32)
CREAT SERPL-MCNC: 0.81 MG/DL (ref 0.5–1.3)
EGFRCR SERPLBLD CKD-EPI 2021: >90 ML/MIN/1.73M*2
EOSINOPHIL # BLD AUTO: 0.05 X10*3/UL (ref 0–0.7)
EOSINOPHIL NFR BLD AUTO: 0.7 %
ERYTHROCYTE [DISTWIDTH] IN BLOOD BY AUTOMATED COUNT: 13.2 % (ref 11.5–14.5)
GLUCOSE SERPL-MCNC: 100 MG/DL (ref 74–99)
HCT VFR BLD AUTO: 45.1 % (ref 41–52)
HGB BLD-MCNC: 15.5 G/DL (ref 13.5–17.5)
IMM GRANULOCYTES # BLD AUTO: 0.01 X10*3/UL (ref 0–0.7)
IMM GRANULOCYTES NFR BLD AUTO: 0.1 % (ref 0–0.9)
LACTATE SERPL-SCNC: 1.8 MMOL/L (ref 0.4–2)
LACTATE SERPL-SCNC: 2.3 MMOL/L (ref 0.4–2)
LYMPHOCYTES # BLD AUTO: 1.73 X10*3/UL (ref 1.2–4.8)
LYMPHOCYTES NFR BLD AUTO: 24.3 %
MCH RBC QN AUTO: 29.2 PG (ref 26–34)
MCHC RBC AUTO-ENTMCNC: 34.4 G/DL (ref 32–36)
MCV RBC AUTO: 85 FL (ref 80–100)
MONOCYTES # BLD AUTO: 0.55 X10*3/UL (ref 0.1–1)
MONOCYTES NFR BLD AUTO: 7.7 %
NEUTROPHILS # BLD AUTO: 4.76 X10*3/UL (ref 1.2–7.7)
NEUTROPHILS NFR BLD AUTO: 66.8 %
NRBC BLD-RTO: 0 /100 WBCS (ref 0–0)
PLATELET # BLD AUTO: 221 X10*3/UL (ref 150–450)
POTASSIUM SERPL-SCNC: 4 MMOL/L (ref 3.5–5.3)
RBC # BLD AUTO: 5.3 X10*6/UL (ref 4.5–5.9)
SODIUM SERPL-SCNC: 138 MMOL/L (ref 136–145)
WBC # BLD AUTO: 7.1 X10*3/UL (ref 4.4–11.3)

## 2025-03-30 PROCEDURE — 2500000004 HC RX 250 GENERAL PHARMACY W/ HCPCS (ALT 636 FOR OP/ED): Performed by: EMERGENCY MEDICINE

## 2025-03-30 PROCEDURE — 83605 ASSAY OF LACTIC ACID: CPT | Performed by: EMERGENCY MEDICINE

## 2025-03-30 PROCEDURE — 2500000001 HC RX 250 WO HCPCS SELF ADMINISTERED DRUGS (ALT 637 FOR MEDICARE OP): Performed by: EMERGENCY MEDICINE

## 2025-03-30 PROCEDURE — 73610 X-RAY EXAM OF ANKLE: CPT | Mod: LEFT SIDE | Performed by: RADIOLOGY

## 2025-03-30 PROCEDURE — 85025 COMPLETE CBC W/AUTO DIFF WBC: CPT | Performed by: EMERGENCY MEDICINE

## 2025-03-30 PROCEDURE — 99284 EMERGENCY DEPT VISIT MOD MDM: CPT | Mod: 25 | Performed by: EMERGENCY MEDICINE

## 2025-03-30 PROCEDURE — 73610 X-RAY EXAM OF ANKLE: CPT | Mod: LT

## 2025-03-30 PROCEDURE — 36415 COLL VENOUS BLD VENIPUNCTURE: CPT | Performed by: EMERGENCY MEDICINE

## 2025-03-30 PROCEDURE — 96360 HYDRATION IV INFUSION INIT: CPT | Performed by: EMERGENCY MEDICINE

## 2025-03-30 PROCEDURE — 80048 BASIC METABOLIC PNL TOTAL CA: CPT | Performed by: EMERGENCY MEDICINE

## 2025-03-30 RX ORDER — SULFAMETHOXAZOLE AND TRIMETHOPRIM 800; 160 MG/1; MG/1
1 TABLET ORAL ONCE
Status: DISCONTINUED | OUTPATIENT
Start: 2025-03-30 | End: 2025-03-30

## 2025-03-30 RX ORDER — CLINDAMYCIN HYDROCHLORIDE 150 MG/1
300 CAPSULE ORAL ONCE
Status: COMPLETED | OUTPATIENT
Start: 2025-03-30 | End: 2025-03-30

## 2025-03-30 RX ORDER — CLINDAMYCIN HYDROCHLORIDE 300 MG/1
300 CAPSULE ORAL 3 TIMES DAILY
Qty: 30 CAPSULE | Refills: 0 | Status: SHIPPED | OUTPATIENT
Start: 2025-03-30 | End: 2025-04-09

## 2025-03-30 RX ORDER — CEPHALEXIN 250 MG/1
500 CAPSULE ORAL ONCE
Status: DISCONTINUED | OUTPATIENT
Start: 2025-03-30 | End: 2025-03-30

## 2025-03-30 RX ADMIN — SODIUM CHLORIDE 1000 ML: 0.9 INJECTION, SOLUTION INTRAVENOUS at 15:32

## 2025-03-30 RX ADMIN — CLINDAMYCIN HYDROCHLORIDE 300 MG: 150 CAPSULE ORAL at 16:45

## 2025-03-30 ASSESSMENT — COLUMBIA-SUICIDE SEVERITY RATING SCALE - C-SSRS
1. IN THE PAST MONTH, HAVE YOU WISHED YOU WERE DEAD OR WISHED YOU COULD GO TO SLEEP AND NOT WAKE UP?: NO
2. HAVE YOU ACTUALLY HAD ANY THOUGHTS OF KILLING YOURSELF?: NO
6. HAVE YOU EVER DONE ANYTHING, STARTED TO DO ANYTHING, OR PREPARED TO DO ANYTHING TO END YOUR LIFE?: NO

## 2025-03-30 ASSESSMENT — LIFESTYLE VARIABLES
EVER FELT BAD OR GUILTY ABOUT YOUR DRINKING: NO
HAVE PEOPLE ANNOYED YOU BY CRITICIZING YOUR DRINKING: NO
EVER HAD A DRINK FIRST THING IN THE MORNING TO STEADY YOUR NERVES TO GET RID OF A HANGOVER: NO
TOTAL SCORE: 0
HAVE YOU EVER FELT YOU SHOULD CUT DOWN ON YOUR DRINKING: NO

## 2025-03-30 ASSESSMENT — PAIN DESCRIPTION - PAIN TYPE: TYPE: ACUTE PAIN

## 2025-03-30 ASSESSMENT — PAIN - FUNCTIONAL ASSESSMENT: PAIN_FUNCTIONAL_ASSESSMENT: 0-10

## 2025-03-30 ASSESSMENT — PAIN DESCRIPTION - ORIENTATION: ORIENTATION: LEFT

## 2025-03-30 ASSESSMENT — PAIN SCALES - GENERAL
PAINLEVEL_OUTOF10: 1
PAINLEVEL_OUTOF10: 0 - NO PAIN
PAINLEVEL_OUTOF10: 3
PAINLEVEL_OUTOF10: 0 - NO PAIN

## 2025-03-30 ASSESSMENT — PAIN DESCRIPTION - LOCATION: LOCATION: ANKLE

## 2025-03-30 ASSESSMENT — PAIN DESCRIPTION - DESCRIPTORS: DESCRIPTORS: ACHING

## 2025-03-30 NOTE — ED PROVIDER NOTES
HPI   Chief Complaint   Patient presents with    discharge from left ankle surgery site       HPI    HISTORY OF PRESENT ILLNESS:  Patient is a 44-year-old presenting to the emergency department concern of left ankle infection.  The patient states that he is 6 weeks postop from a ankle fusion due to his rheumatoid arthritis.   patient states that as she was showering today, he noticed that there was increased redness with the lateral ankle incision site in addition to some purulent discharge.  Patient does have a history of a postoperative infection of the right knee in the past which required hospitalization.    Past Medical History: Rheumatoid arthritis on  Plaquenil, Raynaud's syndrome, hypertension hyperlipidemia  Past Surgical History: Back surgery, ankle surgery, knee surgery  Family History: family history not pertinent to presenting problem or chief complaint  Social History: Denies cigarette smoking.  She is    __________________________________________________________  PHYSICAL EXAM:    Appearance: Alert, oriented , cooperative   Skin: Close exam of the left ankle showed a incision site that appeared closed.  There is some redness but no purulent discharge or fluctuance with the area.    Eyes: PERRLA, EOMs intact,  Conjunctiva pink with no redness or exudates.    HENT: Normocephalic, atraumatic. Nares patent   Neck: Supple. Trachea at midline.   Pulmonary: Lung sounds are clear bilaterally.  There is no rales, rhonchi, or wheezing.  Cardiac: Regular rate and rhythm, no rubs, murmurs, or gallops. No JVD,   Abdomen: Abdomen is soft, nontender, and nondistended.  No palpable organomegaly.  No rebound or guarding.  No CVA tenderness. Nonsurgical abdomen  Genitourinary: Exam deferred.  Musculoskeletal: Left lower extremity without any obvious deformity, skin exam as noted above.  Good pulses.    __________________________________________________________  MEDICAL DECISION MAKING:    Patient was seen and examined.  Differential diagnosis for Patient presented to the emergency department for discharge, now with his left ankle.  He had an ankle fusion approximately 6 weeks ago.  Patient vital signs did not show signs of sepsis.  He came in immediately when noticing this.  I cannot express any pus.  Basic labs and x-rays were obtained.  Patient have a leukocytosis.  Did reach out to the patient's surgeon, Dr. Schreiber.  Stated the patient could follow-up later on this week.  Was started on clindamycin due to medication interaction with Bactrim.  Patient given return precautions.  Verbalized understanding, agreed plan, the patient is discharged home.      Chronic Medical Conditions Significantly Affecting Care: Ankle fusion    External Records Reviewed: I reviewed recent and relevant outside records including: Operative note from February 17, 2025    [facility]  Emergency Medicine    Patient History   Past Medical History:   Diagnosis Date    Personal history of other diseases of the circulatory system     History of hypertension    Personal history of other mental and behavioral disorders     History of anxiety    Personal history of other specified conditions     History of chronic pain    Right peroneal tendinosis 05/24/2023    Sepsis due to methicillin susceptible Staphylococcus aureus (Multi) 10/02/2019     Past Surgical History:   Procedure Laterality Date    OTHER SURGICAL HISTORY  10/15/2019    Tonsillectomy    OTHER SURGICAL HISTORY  10/15/2019    Back surgery    OTHER SURGICAL HISTORY  10/15/2019    Laminectomy    OTHER SURGICAL HISTORY  10/18/2019    Knee surgery    OTHER SURGICAL HISTORY  10/18/2019    Foot fracture repair     Family History   Problem Relation Name Age of Onset    Other (htn) Mother      Breast cancer Mother      Lung cancer Father       Social History     Tobacco Use    Smoking status: Never     Passive exposure: Past    Smokeless tobacco: Never   Vaping Use    Vaping status: Never Used   Substance  Use Topics    Alcohol use: Yes     Comment: 5 drinks per month sometimes    Drug use: Never       Physical Exam   ED Triage Vitals [03/30/25 1455]   Temperature Heart Rate Respirations BP   36.6 °C (97.8 °F) 98 18 (!) 148/93      Pulse Ox Temp Source Heart Rate Source Patient Position   97 % Temporal Monitor Sitting      BP Location FiO2 (%)     Left arm --       Physical Exam      ED Course & MDM   Diagnoses as of 03/30/25 1716   Cellulitis of left lower extremity                 No data recorded     [place] Coma Scale Score: 15 (03/30/25 1516 : Chinyere Mcmullen)                           Medical Decision Making      Procedure  Procedures     Gabo Valenzuela, DO  03/31/25 1458

## 2025-04-02 ENCOUNTER — OFFICE VISIT (OUTPATIENT)
Dept: ORTHOPEDIC SURGERY | Facility: HOSPITAL | Age: 45
End: 2025-04-02
Payer: COMMERCIAL

## 2025-04-02 VITALS — WEIGHT: 310 LBS | HEIGHT: 74 IN | BODY MASS INDEX: 39.78 KG/M2

## 2025-04-02 DIAGNOSIS — Z98.1 STATUS POST ANKLE FUSION: Primary | ICD-10-CM

## 2025-04-02 PROCEDURE — 3008F BODY MASS INDEX DOCD: CPT | Performed by: SPECIALIST

## 2025-04-02 PROCEDURE — 1036F TOBACCO NON-USER: CPT | Performed by: SPECIALIST

## 2025-04-02 PROCEDURE — 99211 OFF/OP EST MAY X REQ PHY/QHP: CPT | Performed by: SPECIALIST

## 2025-04-02 NOTE — PROGRESS NOTES
6 weeks S/P LEFT ANKLE FUSION, LEFT 1ST METATARSAOPHALANGEAL JOINT FUSION DOS 2/17/25    Patient here today for possible infection of incision site was seen at ED 3/30/25.  He denies any fevers chills or other constitutional symptoms.    Exam: Right ankle lateral incision intact there is a small area of erythema but no fluctuance no drainage today.  His ankle remains in neutral alignment and stable to gentle stress testing.  Also first MTPJ is in good alignment with stable stress testing.  Otherwise dermis intact neurovascular intact negative Homans.    Assessment plan: Resolving superficial wound infection.  I see no concern for deep infection he otherwise appears to be healing well.  Will have him come back in 2 weeks for 3 views standing left foot and AP standing left ankle x-rays.  (4 view foot and ankle series)

## 2025-04-14 DIAGNOSIS — M20.12 HALLUX VALGUS OF LEFT FOOT: ICD-10-CM

## 2025-04-15 ENCOUNTER — APPOINTMENT (OUTPATIENT)
Dept: NEUROLOGY | Facility: HOSPITAL | Age: 45
End: 2025-04-15
Payer: COMMERCIAL

## 2025-04-19 LAB
25(OH)D3+25(OH)D2 SERPL-MCNC: 32 NG/ML (ref 30–100)
ALBUMIN SERPL-MCNC: 4.8 G/DL (ref 3.6–5.1)
ALP SERPL-CCNC: 54 U/L (ref 36–130)
ALT SERPL-CCNC: 22 U/L (ref 9–46)
ANION GAP SERPL CALCULATED.4IONS-SCNC: 9 MMOL/L (CALC) (ref 7–17)
AST SERPL-CCNC: 20 U/L (ref 10–40)
BILIRUB SERPL-MCNC: 0.4 MG/DL (ref 0.2–1.2)
BUN SERPL-MCNC: 11 MG/DL (ref 7–25)
CALCIUM SERPL-MCNC: 9.9 MG/DL (ref 8.6–10.3)
CHLORIDE SERPL-SCNC: 104 MMOL/L (ref 98–110)
CHOLEST SERPL-MCNC: 181 MG/DL
CHOLEST/HDLC SERPL: 5.5 (CALC)
CO2 SERPL-SCNC: 28 MMOL/L (ref 20–32)
CREAT SERPL-MCNC: 0.75 MG/DL (ref 0.6–1.29)
EGFRCR SERPLBLD CKD-EPI 2021: 113 ML/MIN/1.73M2
ERYTHROCYTE [DISTWIDTH] IN BLOOD BY AUTOMATED COUNT: 13.9 % (ref 11–15)
EST. AVERAGE GLUCOSE BLD GHB EST-MCNC: 111 MG/DL
EST. AVERAGE GLUCOSE BLD GHB EST-SCNC: 6.2 MMOL/L
GLUCOSE SERPL-MCNC: 96 MG/DL (ref 65–99)
HBA1C MFR BLD: 5.5 %
HCT VFR BLD AUTO: 47.4 % (ref 38.5–50)
HDLC SERPL-MCNC: 33 MG/DL
HGB BLD-MCNC: 15.8 G/DL (ref 13.2–17.1)
LDLC SERPL CALC-MCNC: 112 MG/DL (CALC)
MCH RBC QN AUTO: 30.5 PG (ref 27–33)
MCHC RBC AUTO-ENTMCNC: 33.3 G/DL (ref 32–36)
MCV RBC AUTO: 91.5 FL (ref 80–100)
NONHDLC SERPL-MCNC: 148 MG/DL (CALC)
PLATELET # BLD AUTO: 249 THOUSAND/UL (ref 140–400)
PMV BLD REES-ECKER: 10 FL (ref 7.5–12.5)
POTASSIUM SERPL-SCNC: 4.2 MMOL/L (ref 3.5–5.3)
PROT SERPL-MCNC: 8.4 G/DL (ref 6.1–8.1)
RBC # BLD AUTO: 5.18 MILLION/UL (ref 4.2–5.8)
SODIUM SERPL-SCNC: 141 MMOL/L (ref 135–146)
TRIGL SERPL-MCNC: 254 MG/DL
TSH SERPL-ACNC: 1.63 MIU/L (ref 0.4–4.5)
WBC # BLD AUTO: 6 THOUSAND/UL (ref 3.8–10.8)

## 2025-04-22 ENCOUNTER — HOSPITAL ENCOUNTER (OUTPATIENT)
Dept: RADIOLOGY | Facility: HOSPITAL | Age: 45
Discharge: HOME | End: 2025-04-22
Payer: COMMERCIAL

## 2025-04-22 ENCOUNTER — OFFICE VISIT (OUTPATIENT)
Dept: ORTHOPEDIC SURGERY | Facility: HOSPITAL | Age: 45
End: 2025-04-22
Payer: COMMERCIAL

## 2025-04-22 VITALS — WEIGHT: 310 LBS | BODY MASS INDEX: 39.78 KG/M2 | HEIGHT: 74 IN

## 2025-04-22 DIAGNOSIS — Z98.1 STATUS POST ANKLE FUSION: Primary | ICD-10-CM

## 2025-04-22 DIAGNOSIS — M20.12 HALLUX VALGUS OF LEFT FOOT: ICD-10-CM

## 2025-04-22 PROCEDURE — 99024 POSTOP FOLLOW-UP VISIT: CPT | Performed by: SPECIALIST

## 2025-04-22 PROCEDURE — 73630 X-RAY EXAM OF FOOT: CPT | Mod: LEFT SIDE | Performed by: RADIOLOGY

## 2025-04-22 PROCEDURE — 3008F BODY MASS INDEX DOCD: CPT | Performed by: SPECIALIST

## 2025-04-22 PROCEDURE — 73630 X-RAY EXAM OF FOOT: CPT | Mod: LT

## 2025-04-22 PROCEDURE — 99211 OFF/OP EST MAY X REQ PHY/QHP: CPT | Performed by: SPECIALIST

## 2025-04-22 PROCEDURE — 99212 OFFICE O/P EST SF 10 MIN: CPT | Performed by: SPECIALIST

## 2025-04-22 NOTE — PROGRESS NOTES
2 months S/P LEFT ANKLE FUSION, LEFT 1ST METATARSAOPHALANGEAL JOINT FUSION DOS 2/17/25.  He has been weightbearing fully in his boot and is happy with his progress.  Pain is well-controlled.  XR Done Today     Exam: Alert and oriented x 3 no acute distress.  Left foot with well-healed incisions no signs of infection.  Neurovascular status intact dermis intact good alignment through the ankle and first ray with stable stress testing to both arthrodesis sites.  Swelling present but improved, typical of timeframe.  No significant elicited pain.    Radiographs: 3 views standing foot and AP standing ankle shows a stable arthrodesis of the ankle and first MTPJ with excellent alignment hardware intact.    Assessment plan: Status post reconstructive foot and ankle surgery.  Healing at appropriate timeframe.  He can start to wean from his boot to a shoe over the next month.  Will see him back in 6 weeks for final 4 view foot/ankle standing x-rays.

## 2025-04-23 ENCOUNTER — APPOINTMENT (OUTPATIENT)
Dept: PRIMARY CARE | Facility: CLINIC | Age: 45
End: 2025-04-23
Payer: COMMERCIAL

## 2025-04-23 VITALS
HEART RATE: 87 BPM | BODY MASS INDEX: 39.17 KG/M2 | OXYGEN SATURATION: 98 % | SYSTOLIC BLOOD PRESSURE: 125 MMHG | HEIGHT: 75 IN | WEIGHT: 315 LBS | TEMPERATURE: 97.9 F | RESPIRATION RATE: 14 BRPM | DIASTOLIC BLOOD PRESSURE: 83 MMHG

## 2025-04-23 DIAGNOSIS — I10 BENIGN ESSENTIAL HYPERTENSION: ICD-10-CM

## 2025-04-23 DIAGNOSIS — H61.21 IMPACTED CERUMEN OF RIGHT EAR: ICD-10-CM

## 2025-04-23 DIAGNOSIS — M47.896 OTHER OSTEOARTHRITIS OF SPINE, LUMBAR REGION: ICD-10-CM

## 2025-04-23 DIAGNOSIS — M19.071 ARTHRITIS OF ANKLE, RIGHT: ICD-10-CM

## 2025-04-23 DIAGNOSIS — E66.01 SEVERE OBESITY (BMI 35.0-39.9) WITH COMORBIDITY (MULTI): ICD-10-CM

## 2025-04-23 DIAGNOSIS — M05.772: ICD-10-CM

## 2025-04-23 DIAGNOSIS — E78.1 ESSENTIAL HYPERTRIGLYCERIDEMIA: ICD-10-CM

## 2025-04-23 DIAGNOSIS — R73.01 IMPAIRED FASTING GLUCOSE: ICD-10-CM

## 2025-04-23 DIAGNOSIS — E78.5 HYPERLIPIDEMIA, UNSPECIFIED HYPERLIPIDEMIA TYPE: ICD-10-CM

## 2025-04-23 DIAGNOSIS — E55.9 VITAMIN D DEFICIENCY: ICD-10-CM

## 2025-04-23 DIAGNOSIS — M19.072 ARTHRITIS OF ANKLE, LEFT: ICD-10-CM

## 2025-04-23 DIAGNOSIS — F32.1 DEPRESSION, MAJOR, SINGLE EPISODE, MODERATE (MULTI): ICD-10-CM

## 2025-04-23 DIAGNOSIS — Z00.00 ANNUAL PHYSICAL EXAM: Primary | ICD-10-CM

## 2025-04-23 PROBLEM — H61.20 CERUMEN IMPACTION: Status: ACTIVE | Noted: 2025-04-23

## 2025-04-23 PROBLEM — R53.1 WEAKNESS: Status: RESOLVED | Noted: 2024-08-19 | Resolved: 2025-04-23

## 2025-04-23 PROBLEM — Z01.818 PREOP EXAMINATION: Status: RESOLVED | Noted: 2025-01-23 | Resolved: 2025-04-23

## 2025-04-23 PROCEDURE — 99396 PREV VISIT EST AGE 40-64: CPT | Performed by: FAMILY MEDICINE

## 2025-04-23 PROCEDURE — 3008F BODY MASS INDEX DOCD: CPT | Performed by: FAMILY MEDICINE

## 2025-04-23 PROCEDURE — 99213 OFFICE O/P EST LOW 20 MIN: CPT | Performed by: FAMILY MEDICINE

## 2025-04-23 PROCEDURE — 3074F SYST BP LT 130 MM HG: CPT | Performed by: FAMILY MEDICINE

## 2025-04-23 PROCEDURE — 3079F DIAST BP 80-89 MM HG: CPT | Performed by: FAMILY MEDICINE

## 2025-04-23 PROCEDURE — 69209 REMOVE IMPACTED EAR WAX UNI: CPT | Performed by: FAMILY MEDICINE

## 2025-04-23 PROCEDURE — 1036F TOBACCO NON-USER: CPT | Performed by: FAMILY MEDICINE

## 2025-04-23 RX ORDER — FENOFIBRATE 160 MG/1
160 TABLET ORAL DAILY
Qty: 30 TABLET | Refills: 5 | Status: SHIPPED | OUTPATIENT
Start: 2025-04-23 | End: 2026-04-23

## 2025-04-23 RX ORDER — FLUOXETINE HYDROCHLORIDE 40 MG/1
40 CAPSULE ORAL DAILY
Qty: 30 CAPSULE | Refills: 5 | Status: CANCELLED | OUTPATIENT
Start: 2025-04-23 | End: 2026-04-23

## 2025-04-23 RX ORDER — LISINOPRIL 20 MG/1
20 TABLET ORAL DAILY
Qty: 30 TABLET | Refills: 5 | Status: SHIPPED | OUTPATIENT
Start: 2025-04-23 | End: 2026-04-23

## 2025-04-23 RX ORDER — CYCLOBENZAPRINE HCL 10 MG
10 TABLET ORAL 2 TIMES DAILY PRN
Qty: 60 TABLET | Refills: 5 | Status: SHIPPED | OUTPATIENT
Start: 2025-04-23 | End: 2026-04-23

## 2025-04-23 RX ORDER — FLUOXETINE HYDROCHLORIDE 20 MG/1
20 CAPSULE ORAL DAILY
Qty: 30 CAPSULE | Refills: 5 | Status: SHIPPED | OUTPATIENT
Start: 2025-04-23 | End: 2025-10-20

## 2025-04-23 RX ORDER — BUSPIRONE HYDROCHLORIDE 15 MG/1
15 TABLET ORAL 2 TIMES DAILY
Qty: 60 TABLET | Refills: 5 | Status: SHIPPED | OUTPATIENT
Start: 2025-04-23 | End: 2026-04-23

## 2025-04-23 RX ORDER — CELECOXIB 200 MG/1
CAPSULE ORAL
Qty: 60 CAPSULE | Refills: 5 | Status: SHIPPED | OUTPATIENT
Start: 2025-04-23

## 2025-04-23 ASSESSMENT — COLUMBIA-SUICIDE SEVERITY RATING SCALE - C-SSRS
6. HAVE YOU EVER DONE ANYTHING, STARTED TO DO ANYTHING, OR PREPARED TO DO ANYTHING TO END YOUR LIFE?: NO
2. HAVE YOU ACTUALLY HAD ANY THOUGHTS OF KILLING YOURSELF?: NO
1. IN THE PAST MONTH, HAVE YOU WISHED YOU WERE DEAD OR WISHED YOU COULD GO TO SLEEP AND NOT WAKE UP?: NO

## 2025-04-23 ASSESSMENT — ENCOUNTER SYMPTOMS
CHEST TIGHTNESS: 0
CONFUSION: 0
SHORTNESS OF BREATH: 0
ABDOMINAL PAIN: 0
PALPITATIONS: 0
CHILLS: 0
FEVER: 0
ARTHRALGIAS: 1

## 2025-04-23 NOTE — PROGRESS NOTES
"Subjective   Patient ID: Chacho William is a 44 y.o. male who presents for Annual Exam.    HPI patient today for follow-up of ongoing healthcare issues and review of recent lab work for most part been doing okay he is recently had surgery on left ankle and foot undergoing a left ankle fusion along with bunion repair he has hardware in place currently is in an orthopedic boot and is off work to at least mid June according to the patient as he continues to follow with orthopedics for postop recovery symptomatically though he is already has noticed improvement with decreasing pain and numbness in the foot and toes.  Also he says he is feeling better emotionally now that the pain has resolved.  He like to see about weaning the fluoxetine.  Finally points out that his rheumatologist has left the area and he needed input with regards to finding a new one.    Review of Systems   Constitutional:  Negative for chills and fever.   HENT:  Negative for congestion and ear pain.    Eyes:  Negative for visual disturbance.   Respiratory:  Negative for chest tightness and shortness of breath.    Cardiovascular:  Negative for chest pain and palpitations.   Gastrointestinal:  Negative for abdominal pain.   Musculoskeletal:  Positive for arthralgias.   Skin:  Negative for pallor.   Psychiatric/Behavioral:  Negative for confusion.        Objective   /83   Pulse 87   Temp 36.6 °C (97.9 °F)   Resp 14   Ht 1.905 m (6' 3\")   Wt 143 kg (316 lb)   SpO2 98%   BMI 39.50 kg/m²     Physical Exam  Vitals and nursing note reviewed.   Constitutional:       General: He is not in acute distress.     Appearance: Normal appearance. He is not ill-appearing.   HENT:      Head: Normocephalic and atraumatic.      Right Ear: Ear canal and external ear normal. There is impacted cerumen.      Left Ear: Tympanic membrane, ear canal and external ear normal.      Mouth/Throat:      Pharynx: Oropharynx is clear.   Eyes:      Extraocular Movements: " Extraocular movements intact.   Cardiovascular:      Rate and Rhythm: Normal rate and regular rhythm.      Pulses: Normal pulses.      Heart sounds: Normal heart sounds.   Pulmonary:      Effort: Pulmonary effort is normal.      Breath sounds: Normal breath sounds.   Abdominal:      General: Abdomen is flat. Bowel sounds are normal.      Palpations: Abdomen is soft.      Tenderness: There is no abdominal tenderness.   Musculoskeletal:         General: Normal range of motion.      Cervical back: Neck supple.      Comments: Orthopedic boot left foot ankle   Skin:     General: Skin is warm.   Neurological:      Mental Status: He is alert and oriented to person, place, and time. Mental status is at baseline.   Psychiatric:         Mood and Affect: Mood normal.       Recent Results (from the past 6 weeks)   CBC and Auto Differential    Collection Time: 03/30/25  3:31 PM   Result Value Ref Range    WBC 7.1 4.4 - 11.3 x10*3/uL    nRBC 0.0 0.0 - 0.0 /100 WBCs    RBC 5.30 4.50 - 5.90 x10*6/uL    Hemoglobin 15.5 13.5 - 17.5 g/dL    Hematocrit 45.1 41.0 - 52.0 %    MCV 85 80 - 100 fL    MCH 29.2 26.0 - 34.0 pg    MCHC 34.4 32.0 - 36.0 g/dL    RDW 13.2 11.5 - 14.5 %    Platelets 221 150 - 450 x10*3/uL    Neutrophils % 66.8 40.0 - 80.0 %    Immature Granulocytes %, Automated 0.1 0.0 - 0.9 %    Lymphocytes % 24.3 13.0 - 44.0 %    Monocytes % 7.7 2.0 - 10.0 %    Eosinophils % 0.7 0.0 - 6.0 %    Basophils % 0.4 0.0 - 2.0 %    Neutrophils Absolute 4.76 1.20 - 7.70 x10*3/uL    Immature Granulocytes Absolute, Automated 0.01 0.00 - 0.70 x10*3/uL    Lymphocytes Absolute 1.73 1.20 - 4.80 x10*3/uL    Monocytes Absolute 0.55 0.10 - 1.00 x10*3/uL    Eosinophils Absolute 0.05 0.00 - 0.70 x10*3/uL    Basophils Absolute 0.03 0.00 - 0.10 x10*3/uL   Basic metabolic panel    Collection Time: 03/30/25  4:04 PM   Result Value Ref Range    Glucose 100 (H) 74 - 99 mg/dL    Sodium 138 136 - 145 mmol/L    Potassium 4.0 3.5 - 5.3 mmol/L    Chloride 107  98 - 107 mmol/L    Bicarbonate 23 21 - 32 mmol/L    Anion Gap 12 10 - 20 mmol/L    Urea Nitrogen 13 6 - 23 mg/dL    Creatinine 0.81 0.50 - 1.30 mg/dL    eGFR >90 >60 mL/min/1.73m*2    Calcium 9.3 8.6 - 10.3 mg/dL   Lactate    Collection Time: 03/30/25  4:04 PM   Result Value Ref Range    Lactate 2.3 (H) 0.4 - 2.0 mmol/L   Lactate    Collection Time: 03/30/25  4:38 PM   Result Value Ref Range    Lactate 1.8 0.4 - 2.0 mmol/L   Lipid Panel    Collection Time: 04/18/25  1:06 PM   Result Value Ref Range    CHOLESTEROL, TOTAL 181 <200 mg/dL    HDL CHOLESTEROL 33 (L) > OR = 40 mg/dL    TRIGLYCERIDES 254 (H) <150 mg/dL    LDL-CHOLESTEROL 112 (H) mg/dL (calc)    CHOL/HDLC RATIO 5.5 (H) <5.0 (calc)    NON HDL CHOLESTEROL 148 (H) <130 mg/dL (calc)   Comprehensive Metabolic Panel    Collection Time: 04/18/25  1:06 PM   Result Value Ref Range    GLUCOSE 96 65 - 99 mg/dL    UREA NITROGEN (BUN) 11 7 - 25 mg/dL    CREATININE 0.75 0.60 - 1.29 mg/dL    EGFR 113 > OR = 60 mL/min/1.73m2    SODIUM 141 135 - 146 mmol/L    POTASSIUM 4.2 3.5 - 5.3 mmol/L    CHLORIDE 104 98 - 110 mmol/L    CARBON DIOXIDE 28 20 - 32 mmol/L    ELECTROLYTE BALANCE 9 7 - 17 mmol/L (calc)    CALCIUM 9.9 8.6 - 10.3 mg/dL    PROTEIN, TOTAL 8.4 (H) 6.1 - 8.1 g/dL    ALBUMIN 4.8 3.6 - 5.1 g/dL    BILIRUBIN, TOTAL 0.4 0.2 - 1.2 mg/dL    ALKALINE PHOSPHATASE 54 36 - 130 U/L    AST 20 10 - 40 U/L    ALT 22 9 - 46 U/L   CBC    Collection Time: 04/18/25  1:06 PM   Result Value Ref Range    WHITE BLOOD CELL COUNT 6.0 3.8 - 10.8 Thousand/uL    RED BLOOD CELL COUNT 5.18 4.20 - 5.80 Million/uL    HEMOGLOBIN 15.8 13.2 - 17.1 g/dL    HEMATOCRIT 47.4 38.5 - 50.0 %    MCV 91.5 80.0 - 100.0 fL    MCH 30.5 27.0 - 33.0 pg    MCHC 33.3 32.0 - 36.0 g/dL    RDW 13.9 11.0 - 15.0 %    PLATELET COUNT 249 140 - 400 Thousand/uL    MPV 10.0 7.5 - 12.5 fL   TSH with reflex to Free T4 if abnormal    Collection Time: 04/18/25  1:06 PM   Result Value Ref Range    TSH W/REFLEX TO FT4 1.63 0.40 -  4.50 mIU/L   Vitamin D 25-Hydroxy,Total (for eval of Vitamin D levels)    Collection Time: 04/18/25  1:06 PM   Result Value Ref Range    VITAMIN D,25-OH,TOTAL,IA 32 30 - 100 ng/mL   Hemoglobin A1C    Collection Time: 04/18/25  1:06 PM   Result Value Ref Range    HEMOGLOBIN A1c 5.5 <5.7 %    eAG (mg/dL) 111 mg/dL    eAG (mmol/L) 6.2 mmol/L     Recent labs reviewed with patient overall numbers are stable we will continue current medications and dietary modification    Will decrease fluoxetine to 20 mg daily    Right ear lavage  Continue to follow with orthopedic    List of rheumatology providers in the area provided he will try to schedule an appointment    Call if he has any questions or concerns    Return to office 4 months with repeat fasting labs      Assessment/Plan   Problem List Items Addressed This Visit           ICD-10-CM    Arthritis of ankle, left M19.072    Status post left ankle fusion now in orthopedic boot recovering postoperatively.         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    Stable continue current treatment         Relevant Medications    cyclobenzaprine (Flexeril) 10 mg tablet    Depression, major, single episode, moderate (Multi) F32.1    Clinically doing better he feels that the fact that he is in less pain than he has been is helping his emotional state he would like to see if he can wean the fluoxetine we will decrease his 20 mg daily and continue to monitor         Relevant Medications    busPIRone (Buspar) 15 mg tablet    FLUoxetine (PROzac) 20 mg capsule    Other Relevant Orders     Follow Up In Primary Care - Established    Essential hypertriglyceridemia E78.1    Stable continue fenofibrate and dietary modification         Hyperlipidemia E78.5    Dietary modification continue fenofibrate         Relevant Medications    fenofibrate (Triglide) 160 mg tablet    Other Relevant Orders    Comprehensive Metabolic Panel    Lipid Panel    Follow Up In Primary Care - Established    Impaired fasting glucose R73.01    A1c 5.5% continue dietary modifications         Relevant Orders    Comprehensive Metabolic Panel    Hemoglobin A1C    Follow Up In Primary Care - Established    Vitamin D deficiency E55.9    Continue to monitor supplement as needed         Relevant Orders    Vitamin D 25-Hydroxy,Total (for eval of Vitamin D levels)    Severe obesity (BMI 35.0-39.9) with comorbidity (Multi) E66.01    Dietary modification    Once he recovers from his left foot and ankle surgery he needs to try to increase level of physical activity to also aid in weight loss         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 (Multi) M05.772    Patient's rheumatologist has left the healthcare system patient provided with list of other rheumatology specialist in the area he will call to try to schedule an appointment.  He also says will call their office for refills on Plaquenil and needed if he is having difficulty getting his medication that he is to notify our office.         Cerumen impaction H61.20    Right ear lavage         Relevant Orders    Ear Cerumen Removal    Annual physical exam - Primary Z00.00    Recent labs reviewed    Healthy diet habits reviewed    Declines immunizations

## 2025-04-23 NOTE — ASSESSMENT & PLAN NOTE
Dietary modification    Once he recovers from his left foot and ankle surgery he needs to try to increase level of physical activity to also aid in weight loss

## 2025-04-23 NOTE — ASSESSMENT & PLAN NOTE
Clinically doing better he feels that the fact that he is in less pain than he has been is helping his emotional state he would like to see if he can wean the fluoxetine we will decrease his 20 mg daily and continue to monitor

## 2025-04-23 NOTE — PROGRESS NOTES
Patient ID: Chacho William is a 44 y.o. male.    Ear Cerumen Removal    Date/Time: 4/23/2025 12:37 PM    Performed by: Aliya Ochoa MA  Authorized by: Justice Vargas MD    Consent:     Consent obtained:  Verbal    Consent given by:  Patient    Risks discussed:  Incomplete removal, dizziness, bleeding, infection, pain and TM perforation    Alternatives discussed:  Referral, observation, alternative treatment and delayed treatment  Universal protocol:     Procedure explained and questions answered to patient or proxy's satisfaction: yes      Relevant documents present and verified: yes      Test results available: yes      Imaging studies available: yes      Required blood products, implants, devices, and special equipment available: yes      Site/side marked: yes      Immediately prior to procedure, a time out was called: yes      Patient identity confirmed:  Provided demographic data and verbally with patient  Procedure details:     Location:  R ear    Procedure type: irrigation      Procedure outcomes: cerumen removed    Post-procedure details:     Inspection:  No bleeding    Hearing quality:  Improved    Procedure completion:  Tolerated

## 2025-04-23 NOTE — ASSESSMENT & PLAN NOTE
Patient's rheumatologist has left the healthcare system patient provided with list of other rheumatology specialist in the area he will call to try to schedule an appointment.  He also says will call their office for refills on Plaquenil and needed if he is having difficulty getting his medication that he is to notify our office.

## 2025-05-06 ENCOUNTER — TELEPHONE (OUTPATIENT)
Dept: ORTHOPEDIC SURGERY | Facility: CLINIC | Age: 45
End: 2025-05-06
Payer: COMMERCIAL

## 2025-05-06 DIAGNOSIS — Z98.1 STATUS POST ANKLE FUSION: ICD-10-CM

## 2025-05-06 RX ORDER — SULFAMETHOXAZOLE AND TRIMETHOPRIM 800; 160 MG/1; MG/1
1 TABLET ORAL 2 TIMES DAILY
Qty: 14 TABLET | Refills: 0 | Status: SHIPPED | OUTPATIENT
Start: 2025-05-06

## 2025-05-06 NOTE — TELEPHONE ENCOUNTER
Bactrim called in to the [facility] in [facility], will be seen tomorrow in office.

## 2025-05-06 NOTE — TELEPHONE ENCOUNTER
Patient had left ankle fusion done on 2/17, his incision has split open again and is leaking puss. He believes that he has another infection in his ankle. He wants to know if we call in anew script of antibiotics to the [facility] pharmacy in [place].    Please advise

## 2025-05-07 ENCOUNTER — OFFICE VISIT (OUTPATIENT)
Dept: ORTHOPEDIC SURGERY | Facility: HOSPITAL | Age: 45
End: 2025-05-07
Payer: COMMERCIAL

## 2025-05-07 VITALS — HEIGHT: 75 IN | WEIGHT: 315 LBS | BODY MASS INDEX: 39.17 KG/M2

## 2025-05-07 DIAGNOSIS — Z98.1 STATUS POST ANKLE FUSION: Primary | ICD-10-CM

## 2025-05-07 PROCEDURE — 3008F BODY MASS INDEX DOCD: CPT | Performed by: SPECIALIST

## 2025-05-07 PROCEDURE — 99024 POSTOP FOLLOW-UP VISIT: CPT | Performed by: SPECIALIST

## 2025-05-07 PROCEDURE — 99212 OFFICE O/P EST SF 10 MIN: CPT | Performed by: SPECIALIST

## 2025-05-07 NOTE — PROGRESS NOTES
S/P LEFT ANKLE FUSION, LEFT 1ST METATARSAOPHALANGEAL JOINT FUSION DOS 2/17/25.      Patient here today for wound check.  He has sensitivity over the lateral incision but no constitutional symptoms, no fevers chills, still wearing his boot with improved pain weightbearing as tolerated.    Exam: Left lateral ankle incision once again is well-healed but has small areas of excoriation typical of stitch reaction.  No fluctuance no drainage today rest exams unremarkable.    Assessment plan: Postoperative stitch abscess/reaction.  No active infection today.  Recommend local care with bacitracin soap and water.  Follow-up as per previously arranged in June

## 2025-05-16 ENCOUNTER — APPOINTMENT (OUTPATIENT)
Dept: RHEUMATOLOGY | Facility: CLINIC | Age: 45
End: 2025-05-16
Payer: COMMERCIAL

## 2025-05-28 ENCOUNTER — TELEPHONE (OUTPATIENT)
Dept: SLEEP MEDICINE | Facility: CLINIC | Age: 45
End: 2025-05-28
Payer: COMMERCIAL

## 2025-05-28 DIAGNOSIS — M35.9 CONNECTIVE TISSUE DISEASE (MULTI): ICD-10-CM

## 2025-05-28 RX ORDER — HYDROXYCHLOROQUINE SULFATE 200 MG/1
200 TABLET, FILM COATED ORAL 2 TIMES DAILY
Qty: 180 TABLET | Refills: 0 | Status: SHIPPED | OUTPATIENT
Start: 2025-05-28 | End: 2025-08-26

## 2025-05-28 NOTE — PROGRESS NOTES
Patient called in to office requesting refill of his Hydroxychloroquine.     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 CRP elevated at 3.08 and ESR at 30. RF negative. Has been on  mg BID since 8/2022 and Nifedipine for RP.     Last seen by Dr. Martines 1/17/2025.  Noted UTD eye exam. Most recent labs 4/2025 wnl.     Will refill HCQ today and alert  of need to schedule follow up. Either with me or in fellows clinic.

## 2025-05-29 DIAGNOSIS — Z98.1 STATUS POST ANKLE FUSION: Primary | ICD-10-CM

## 2025-05-30 DIAGNOSIS — M20.12 HALLUX VALGUS OF LEFT FOOT: ICD-10-CM

## 2025-06-03 ENCOUNTER — HOSPITAL ENCOUNTER (OUTPATIENT)
Dept: RADIOLOGY | Facility: HOSPITAL | Age: 45
Discharge: HOME | End: 2025-06-03
Payer: COMMERCIAL

## 2025-06-03 ENCOUNTER — OFFICE VISIT (OUTPATIENT)
Dept: ORTHOPEDIC SURGERY | Facility: HOSPITAL | Age: 45
End: 2025-06-03
Payer: COMMERCIAL

## 2025-06-03 VITALS — HEIGHT: 75 IN | WEIGHT: 315 LBS | BODY MASS INDEX: 39.17 KG/M2

## 2025-06-03 DIAGNOSIS — M20.12 HALLUX VALGUS OF LEFT FOOT: ICD-10-CM

## 2025-06-03 DIAGNOSIS — M19.072 ARTHRITIS OF LEFT ANKLE: Primary | ICD-10-CM

## 2025-06-03 PROCEDURE — 73630 X-RAY EXAM OF FOOT: CPT | Mod: LEFT SIDE | Performed by: STUDENT IN AN ORGANIZED HEALTH CARE EDUCATION/TRAINING PROGRAM

## 2025-06-03 PROCEDURE — 73630 X-RAY EXAM OF FOOT: CPT | Mod: LT

## 2025-06-03 PROCEDURE — 99213 OFFICE O/P EST LOW 20 MIN: CPT | Performed by: SPECIALIST

## 2025-06-03 PROCEDURE — 99212 OFFICE O/P EST SF 10 MIN: CPT | Performed by: SPECIALIST

## 2025-06-03 PROCEDURE — 3008F BODY MASS INDEX DOCD: CPT | Performed by: SPECIALIST

## 2025-06-03 ASSESSMENT — ENCOUNTER SYMPTOMS
OCCASIONAL FEELINGS OF UNSTEADINESS: 0
LOSS OF SENSATION IN FEET: 0
DEPRESSION: 0

## 2025-06-03 NOTE — LETTER
Luna 3, 2025     Patient: Chacho William   YOB: 1980   Date of Visit: 6/3/2025       To Whom It May Concern:    It is my medical opinion that Chacho William may return to work on 06/16/2025.  Must wear fracture boot at work for 1-2 weeks.    If you have any questions or concerns, please don't hesitate to call.         Sincerely,        Reji Champion MD    CC: No Recipients

## 2025-06-03 NOTE — PROGRESS NOTES
S/P LEFT ANKLE FUSION, LEFT 1ST METATARSAOPHALANGEAL JOINT FUSION DOS 2/17/25.    XR Done Today   Patient doing well infection has cleared.  He is weightbearing as tolerated in a boot and feeling much better compared to his preoperative status.    Exam: Left ankle and foot incisions all well-healed no evidence of infection or drainage.  Dermis intact neurovascular intact.  Stable painless passive motion of the first MTPJ and the ankle.  Subtalar complex motion intact without pain.    Radiographs: 3 views standing of the foot and ankle show stable well-healed ankle fusion and first MTP fusion with intact hardware both locations.    Assessment plan: Status post ankle and first MTP fusions.  Patient has been doing well and will start to wean from his boot to a shoe.  Could return to work on 616, but recommend having the option of wearing the boot at work for 1 to 2 months.  Follow-up in 3 months with me no x-rays unless pain persists or worsens.

## 2025-07-09 ENCOUNTER — APPOINTMENT (OUTPATIENT)
Dept: RHEUMATOLOGY | Facility: CLINIC | Age: 45
End: 2025-07-09
Payer: COMMERCIAL

## 2025-07-09 VITALS
SYSTOLIC BLOOD PRESSURE: 132 MMHG | TEMPERATURE: 98 F | BODY MASS INDEX: 40.43 KG/M2 | HEIGHT: 74 IN | HEART RATE: 89 BPM | OXYGEN SATURATION: 95 % | DIASTOLIC BLOOD PRESSURE: 89 MMHG | WEIGHT: 315 LBS

## 2025-07-09 DIAGNOSIS — I73.00 RAYNAUD'S PHENOMENON WITHOUT GANGRENE: ICD-10-CM

## 2025-07-09 DIAGNOSIS — M35.9 UNDIFFERENTIATED CONNECTIVE TISSUE DISEASE (MULTI): Primary | ICD-10-CM

## 2025-07-09 PROCEDURE — 99214 OFFICE O/P EST MOD 30 MIN: CPT

## 2025-07-09 PROCEDURE — 3079F DIAST BP 80-89 MM HG: CPT

## 2025-07-09 PROCEDURE — 1036F TOBACCO NON-USER: CPT

## 2025-07-09 PROCEDURE — 3075F SYST BP GE 130 - 139MM HG: CPT

## 2025-07-09 PROCEDURE — 3008F BODY MASS INDEX DOCD: CPT

## 2025-07-09 RX ORDER — AMLODIPINE BESYLATE 5 MG/1
5 TABLET ORAL DAILY
Qty: 30 TABLET | Refills: 11 | Status: SHIPPED | OUTPATIENT
Start: 2025-07-09 | End: 2026-07-09

## 2025-07-09 RX ORDER — PREDNISONE 5 MG/1
5 TABLET ORAL DAILY
Qty: 10 TABLET | Refills: 0 | Status: SHIPPED | OUTPATIENT
Start: 2025-07-09 | End: 2025-07-09

## 2025-07-09 RX ORDER — PREDNISONE 5 MG/1
5 TABLET ORAL DAILY
Qty: 90 TABLET | Refills: 0 | Status: SHIPPED | OUTPATIENT
Start: 2025-07-09 | End: 2025-10-07

## 2025-07-09 ASSESSMENT — PATIENT HEALTH QUESTIONNAIRE - PHQ9
SUM OF ALL RESPONSES TO PHQ9 QUESTIONS 1 AND 2: 0
2. FEELING DOWN, DEPRESSED OR HOPELESS: NOT AT ALL
1. LITTLE INTEREST OR PLEASURE IN DOING THINGS: NOT AT ALL

## 2025-07-09 ASSESSMENT — ENCOUNTER SYMPTOMS
LOSS OF SENSATION IN FEET: 1
OCCASIONAL FEELINGS OF UNSTEADINESS: 1
DEPRESSION: 0

## 2025-07-09 ASSESSMENT — PAIN SCALES - GENERAL: PAINLEVEL_OUTOF10: 3

## 2025-07-09 NOTE — PROGRESS NOTES
Rheumatology Note      Patient Chacho William  MRN [id number]     CC: Mr. Chacho William is a 44 y.o. male presents to the rheumatology clinic for multi-connective tissue disease to reestablish care.    Subjective    Subjective   History of Presenting Illness  Mr. Chacho William is a 44 y.o. male with a PMHx of HTN, anxiety and depression, HLD, and multi-CT, Raynaud's phenomena, chronic lower back pain with radiculopathy, gout, psoriasis, EMANI, laminectomies x2 in 2008 and 2018, ankle fusion and bunionectomy 2/2025 who presents to the rheumatology clinic to reestablish care. He previously followed with Dr Martines.    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 Shortly after that admission, he developed significant stiffness and swelling of multiple joints and raynaud's phenomena (blackening of his digits in the cold). His arthralgias were so diffuse and significant that he was unable to ambulate and lost his job. Lab work revealed positive for KATI 1:2560, positive anti-RNP, positive anti-SM, positive anti-chromatin. He has had psoriasis since he was a child. He was subsequently started on Plaquenil 8/2022 which he states has improved his pain significantly. Denies any psoriasis flares since then as well. He briefly tried steroids for a couple of days a couple years ago which resolved all of his sxs. He was off plaquenil for his ankle surgery in 2/2025 during which he had his last severe flare. He states that he has RA, PsA, and Raynauds.     Today, the patient states that he has stiffness and swelling of his wrists, DIP/PIPs, knees, ankles, and shoulders, particularly in the AM. He states this is different from a flare in the sense that when he flares up, he has significant pain with the stiffness. Reports stress as a trigger. Last severe flare in 2/2025  when he was off plaquenil pre-op. Takes celebrex BID for control of stiffness. Reports dry eyes and dry mouth for which he takes eye drops and cinnamon altoids. His last episode of raynauds was last week (often on his kneecaps, fingers, and toes which turn black) for which he takes his prn amlodipine 5 mg.    FMHx autoimmune disease: father had psoriasis, mother had arthritis    ROS:  Constitutional: Denies fever, chills, fatigue, unintentional weight changes  Head: Denies headaches or hair loss  Eyes: Denies blurry vision, redness of the eyes, pain in the eyes or H/O uveitis/scleritis/episcleritis. Reports dry eyes  ENT: Denies dry mouth, loss of taste, sores in the mouth, nose bleed, difficulty swallowing, jaw pain, sinus issues. Reports dry mouth  Cardiovascular: Denies chest pain, palpitations  Respiratory: Denies shortness of breath or cough or wheezing  Gastrointestinal: Denies nausea, vomiting, heartburn, abdominal pain, diarrhea or blood in the stool  Genitourinary: No urinary urgency, frequency, dysuria, hematuria, genital ulcers  Integumentary: Denies photosensitivity, rash or lesions, Raynaud's or psoriasis  Neurological: Denies weakness. Reports numbness and tingling of toes  Hematologic/Lymphatic: Denies bleeding, bruising, Raynaud's phenomenon, prior DVT/PE  MSK: As per HPI. No enthesitis, sausage finger, finger tip ulceration, or back pain    Medical History[1]     RX Allergies[2]     Objective   Objective     There were no vitals taken for this visit.     PHYSICAL EXAM:  General - NAD, pleasant, AAOx3  Head: Normocephalic, atraumatic  Eyes - PERRLA, EOMI. No conjunctiva injection.   Mouth/ENT - Moist oral and nasal mucosa. No facial rash. No enlarged parotid or submandibular gland. Adequate salivary pooling.  Cardiovascular - Regular rate and rhythm. No murmurs or rubs.  Lungs - Clear to auscultation bilaterally.   Skin - No rashes or ulcers. No erythema on bilateral cheeks.  Abdomen - Soft,  non-tender. No masses.   Extremities - No edema, cyanosis ,or clubbing  Neurological - Alert and oriented x 3,  grossly intact. No focal deficit.    Musculoskeletal  Shoulders: Full ROM, without pain, no swelling, warmth or tenderness.  Elbows: Full ROM, without pain, no swelling, warmth or tenderness.  Wrists: Full ROM, without pain, no swelling, warmth or tenderness.  MCP: No swelling, warmth or tenderness. Metacarpal squeeze negative  PIP: No swelling, warmth or tenderness.  DIP: No swelling, warmth or tenderness.  Hands : 5/5.    Sacroiliac joints: No local tenderness. SO negative.   Hips: Full ROM.  No malalignment.  Knees:  Full ROM, without pain, no swelling, warmth or point tenderness. No joint line tenderness, no pes anserine tenderness.  Ankles: Full ROM, without pain, swelling, warmth or tenderness.  Toes: No swelling, warmth or tenderness. Metatarsal squeeze negative  Cervical spine: No tenderness or limitation of movement  Lumbar spine: No tenderness or limitation of movement     LABS:  Lab Results   Component Value Date    WBC 6.0 04/18/2025    HGB 15.8 04/18/2025    HCT 47.4 04/18/2025    MCV 91.5 04/18/2025     04/18/2025      Lab Results   Component Value Date    GLUCOSE 96 04/18/2025    CO2 28 04/18/2025    BUN 11 04/18/2025    EGFR 113 04/18/2025    CALCIUM 9.9 04/18/2025    ALBUMIN 4.8 04/18/2025      Lab Results   Component Value Date    CRP 0.10 09/16/2024    CRP <0.10 05/15/2024    CRP <0.10 02/12/2024    CRP 0.17 10/09/2023    CRP 0.10 05/22/2023     Lab Results   Component Value Date    SEDRATE 14 09/16/2024    SEDRATE 5 05/15/2024    SEDRATE 4 02/12/2024    SEDRATE 18 (H) 10/09/2023    SEDRATE 8 05/22/2023     Lab Results   Component Value Date    C3 141 05/15/2024    C4 40 05/15/2024     Lab Results   Component Value Date    RF <10 11/07/2024    CITAB <1 11/07/2024     Lab Results   Component Value Date    ANATITER 1:2560 07/18/2022    ARNP >8.0 (A) 07/18/2022    ASMRN >8.0  "(A) 07/18/2022    ASSA <0.2 07/18/2022    ASSB <0.2 07/18/2022    ASCL 0.2 07/18/2022    JO1 <0.2 07/18/2022    ACHR >8.0 (A) 07/18/2022    ACEN <0.2 07/18/2022    RIBPP <0.2 07/18/2022    DNADS <1.0 05/15/2024     Lab Results   Component Value Date    PROTUR 9 08/15/2022    PROTUR NEGATIVE 08/15/2022    GLUCOSEU NEGATIVE 08/15/2022    BLOODU NEGATIVE 08/15/2022    KETONESU NEGATIVE 08/15/2022    NITRITEU NEGATIVE 08/15/2022    LEUKOCYTESU NEGATIVE 08/15/2022     Lab Results   Component Value Date    UTPCR 0.09 08/15/2022        Lab Results   Component Value Date    URICACID 7.9 (H) 05/15/2024     No results found for: \"COLORFL\", \"CLARITYFLUID\", \"WBCFL\", \"NEUTROBFREL\", \"LYMPHSBFREL\", \"EOSBFREL\", \"BASOBFREL\", \"PLASMACFLD\", \"RBCFL\", \"CRYSFL\"    IMAGING:  XR  L foot 6/3/25:  Postsurgical changes status post 1st metatarsophalangeal instrumented fusion and tibiotalar fusion. No evidence of hardware complication.    XR L ankle 3/30/25:  Postsurgical changes without acute abnormality about the ankle status post ankle fusion. No acute abnormality.    MRI L spine and C spine 12/31/24:  C5/C6: Moderate right and moderate left foraminal stenosis.  C6/C7: Moderate left foraminal stenosis. Increased multilevel degenerative changes.  L4/L5: Increased disc space narrowing and edema. Leading differential consideration is advanced degenerative changes.  L4/L5: Hemilaminectomy changes. Severe right and moderate left foraminal stenosis.  L5/S1: Hemilaminectomy changes. Severe left foraminal stenosis.    Assessment    Assessment & Plan   Mr. Chacho William is a 44 y.o. male with a PMHx of HTN, anxiety and depression, HLD, and multi-CT, Raynaud's phenomena, chronic lower back pain with radiculopathy, gout, psoriasis, EMANI, laminectomies x2 in 2008 and 2018, ankle fusion and bunionectomy 2/2025 who presents with stiffness and swelling of multiple joints and raynaud's phenomena (blackening of his digits in the cold). Lab work revealed " positive for KATI 1:2560, positive anti-RNP, positive anti-SM, positive anti-chromatin. He has had psoriasis since he was a child. He was subsequently started on Plaquenil 8/2022 which he states has improved his pain significantly. Denies any psoriasis flares since then as well. He briefly tried steroids for a couple of days a couple years ago which resolved all of his sxs. He was off plaquenil for his ankle surgery in 2/2025 during which he had his last severe flare. Today, he states he feels relatively okay on plaquenil and does not have significant joint pain, but continues to have AM stiffness. Given positive anti-RNP along with his above sxs, the patient does fit into undifferentiated CTD rather than RA, PsA, lupus.    Undifferentiated multi-connective tissue disease  - Chronic, uncontrolled  - Appears to be classic undifferentiated CTD with positive anti-RNP and sxs of diffuse AM stiffness  - Obtain ESR, CRP to assess for further inflammation, urine protein and cr, UA, and microscopic UA to assess for signs of lupus, and HLA-B27 to assess for PsA as we cannot rule out these conditions with his current presentation  - Continue plaquenil 200 mg BID and initiate prednisone 5 mg daily  - Continue celebrex for stiffness  - Patient to go for eye exam in August  - Follow up closely in 3 months to assess need to initiate further     Raynauds phenomena  - Chronic, controlled  - Continue amlodipine 5 mg prn. Patient does not take it daily as he has some SE of GI upset. Has tried nifedipine in the past but states it has not helped    Independently reviewed three or more labs  Images including CXR, CT, MRI independently reviewed.   Monitoring for drug toxicity     Case seen and discussed with Dr. Nash  Signature: Rubina Alvarado MD  Date: July 9, 2025         [1]   Past Medical History:  Diagnosis Date    Personal history of other diseases of the circulatory system     History of hypertension    Personal history of other  mental and behavioral disorders     History of anxiety    Personal history of other specified conditions     History of chronic pain    Right peroneal tendinosis 05/24/2023    Sepsis due to methicillin susceptible Staphylococcus aureus (Multi) 10/02/2019   [2]   Allergies  Allergen Reactions    Nsaids (Non-Steroidal Anti-Inflammatory Drug) Nausea Only and Other

## 2025-07-10 LAB
CRP SERPL-MCNC: <3 MG/L
ERYTHROCYTE [SEDIMENTATION RATE] IN BLOOD BY WESTERGREN METHOD: 6 MM/H
HLA-B27 QL NAA+PROBE: NORMAL
QUEST HLAB27 TYPING RESULTS REVIEWED BY:: NORMAL

## 2025-07-15 LAB
CRP SERPL-MCNC: <3 MG/L
ERYTHROCYTE [SEDIMENTATION RATE] IN BLOOD BY WESTERGREN METHOD: 6 MM/H
HLA-B27 QL NAA+PROBE: NEGATIVE
QUEST HLAB27 TYPING RESULTS REVIEWED BY:: NORMAL

## 2025-07-23 DIAGNOSIS — R73.01 IMPAIRED FASTING GLUCOSE: ICD-10-CM

## 2025-07-23 DIAGNOSIS — E55.9 VITAMIN D DEFICIENCY: ICD-10-CM

## 2025-07-23 DIAGNOSIS — E78.5 HYPERLIPIDEMIA, UNSPECIFIED HYPERLIPIDEMIA TYPE: ICD-10-CM

## 2025-07-23 DIAGNOSIS — I10 BENIGN ESSENTIAL HYPERTENSION: ICD-10-CM

## 2025-08-25 ENCOUNTER — APPOINTMENT (OUTPATIENT)
Dept: PRIMARY CARE | Facility: CLINIC | Age: 45
End: 2025-08-25
Payer: COMMERCIAL

## 2025-09-09 ENCOUNTER — APPOINTMENT (OUTPATIENT)
Dept: ORTHOPEDIC SURGERY | Facility: HOSPITAL | Age: 45
End: 2025-09-09
Payer: COMMERCIAL

## 2025-09-09 ENCOUNTER — APPOINTMENT (OUTPATIENT)
Dept: PRIMARY CARE | Facility: CLINIC | Age: 45
End: 2025-09-09
Payer: COMMERCIAL

## 2025-10-02 ENCOUNTER — APPOINTMENT (OUTPATIENT)
Dept: PRIMARY CARE | Facility: CLINIC | Age: 45
End: 2025-10-02
Payer: COMMERCIAL

## 2025-10-14 ENCOUNTER — APPOINTMENT (OUTPATIENT)
Dept: RHEUMATOLOGY | Facility: CLINIC | Age: 45
End: 2025-10-14
Payer: COMMERCIAL

## (undated) DEVICE — SUTURE, ETHILON, 3-0, 18 IN, PS1, BLACK

## (undated) DEVICE — SUTURE, VICRYL, 4-0, 18 IN, PS2, UNDYED

## (undated) DEVICE — APPLICATOR, CHLORAPREP, W/ORANGE TINT, 26ML

## (undated) DEVICE — 5.0 DRILL

## (undated) DEVICE — SUTURE, VICRYL, 3-0, 18 IN, PS2, UNDYED

## (undated) DEVICE — DRAPE, INCISE, ANTIMICROBIAL, IOBAN 2, LARGE, 17 X 23 IN, DISPOSABLE, STERILE

## (undated) DEVICE — SYRINGE, 10 CC, LUER LOCK

## (undated) DEVICE — ADHESIVE, SKIN, MASTISOL, 2/3 CC VIAL

## (undated) DEVICE — BANDAGE, COMPRESSION, EZE-BAND, 4 X 11 YDS

## (undated) DEVICE — PADDING, UNDERCAST, WEBRIL, 4 IN X 4 YD, REG, NS

## (undated) DEVICE — SOLUTION, IRRIGATION, SODIUM CHLORIDE 0.9%, 1000 ML, POUR BOTTLE

## (undated) DEVICE — SPONGE, GAUZE, XRAY DECT, 16 PLY, 4 X 4, W/MASTER DMT,STERILE

## (undated) DEVICE — Device

## (undated) DEVICE — DRAPE, SHEET, THREE QUARTER, FAN FOLD, 57 X 77 IN

## (undated) DEVICE — GLOVE, SURGICAL, PROTEXIS PI BLUE W/NEUTHERA, 8.0, PF, LF

## (undated) DEVICE — BANDAGE, STRETCH, CONFORM, 2 IN X 4.1 YD, STERILE

## (undated) DEVICE — SYRINGE, 20 CC, LUER LOCK, MONOJECT, W/O CAP, LF

## (undated) DEVICE — DRESSING, GAUZE, PETROLATUM, STRIP, XEROFORM, 1 X 8 IN, STERILE

## (undated) DEVICE — TAPE, CAST, SCOTCHCAST, PLUS, 4 IN X 4 YD, FIBERGLASS, WHITE

## (undated) DEVICE — COVER HANDLE LIGHT, STERIS, BLUE, STERILE

## (undated) DEVICE — CUFF, TOURNIQUET, DUAL PORT, SNG BLADDER, 34 IN, PLC

## (undated) DEVICE — DRAPE COVER, C ARM, FLOUROSCAN IMAGING SYS

## (undated) DEVICE — DRAPE, C-ARM, FLUROSCAN, MINI

## (undated) DEVICE — BLADE, OSCILLATING/SAGITTAL, 31MM X 9MM

## (undated) DEVICE — SUTURE, VICRYL 2-0, TAPER POINT, CT-1 UNDYED 27 INCH

## (undated) DEVICE — TOWEL PACK, STERILE, 4/PACK, BLUE

## (undated) DEVICE — GLOVE, SURGICAL, PROTEXIS PI , 8.0, PF, LF

## (undated) DEVICE — DRESSING, ABDOMINAL, WET PRUF, TENDERSORB, 5 X 9 IN, STERILE

## (undated) DEVICE — DRAPE, SHEET, U, STERI DRAPE, 47 X 51 IN, DISPOSABLE, STERILE

## (undated) DEVICE — BANDAGE, ELASTIC, ACE, SELF-CLOSURE, 4 IN X 5 YD, NONSTERILE

## (undated) DEVICE — SUTURE, VICRYL, 0, 27 IN, CT-1, UNDYED

## (undated) DEVICE — PADDING, CAST, SOFT, 4 IN

## (undated) DEVICE — DRESSING, GAUZE, 8 PLY, CURITY, 4 X 4 IN, BULK, NS

## (undated) DEVICE — SYRINGE, 20 CC, LUER LOCK

## (undated) DEVICE — SPONGE, LAP, XRAY DECT, 18IN X 18IN, W/MASTER DMT, STERILE

## (undated) DEVICE — BURR, OVAL MEDIUM, 4.0MM

## (undated) DEVICE — BUR, SOLID OVAL, CARBIDE, MEDIUM, 4MM

## (undated) DEVICE — CAP, PROTECTIVE, BALL, JURGAN, 1.6 MM PIN, GREEN

## (undated) DEVICE — SUTURE, ETHILON, 4-0, BLK, MONO, PS-2 18

## (undated) DEVICE — SOLUTION, SALINE, 100ML

## (undated) DEVICE — NEEDLE, HYPODERMIC, REGULAR WALL, REGULAR BEVEL, 22 G X 1.5 IN